# Patient Record
Sex: FEMALE | Race: WHITE | Employment: FULL TIME | URBAN - METROPOLITAN AREA
[De-identification: names, ages, dates, MRNs, and addresses within clinical notes are randomized per-mention and may not be internally consistent; named-entity substitution may affect disease eponyms.]

---

## 2017-04-25 ENCOUNTER — TELEPHONE (OUTPATIENT)
Dept: FAMILY MEDICINE | Facility: CLINIC | Age: 42
End: 2017-04-25

## 2017-04-25 DIAGNOSIS — B00.1 RECURRENT COLD SORES: ICD-10-CM

## 2017-04-25 RX ORDER — VALACYCLOVIR HYDROCHLORIDE 1 G/1
2000 TABLET, FILM COATED ORAL 2 TIMES DAILY
Qty: 30 TABLET | Refills: 0 | Status: SHIPPED | OUTPATIENT
Start: 2017-04-25 | End: 2017-07-19

## 2017-04-25 NOTE — TELEPHONE ENCOUNTER
Patient calling requesting refill of Valtrex.   Last Written Prescription Date: 12/2/15  Last Fill Quantity: 30, # refills: 0  Last Office Visit with [facility], [facility] or [facility] prescribing provider: 10/27/16        Creatinine   Date Value Ref Range Status   10/27/2016 0.79 0.52 - 1.04 mg/dL Final     Refilled per RN protocol.    Erna Cruz RN  [facility]

## 2017-07-19 ENCOUNTER — OFFICE VISIT (OUTPATIENT)
Dept: FAMILY MEDICINE | Facility: CLINIC | Age: 42
End: 2017-07-19
Payer: COMMERCIAL

## 2017-07-19 VITALS
HEIGHT: 70 IN | WEIGHT: 203.7 LBS | SYSTOLIC BLOOD PRESSURE: 124 MMHG | BODY MASS INDEX: 29.16 KG/M2 | OXYGEN SATURATION: 100 % | DIASTOLIC BLOOD PRESSURE: 70 MMHG | HEART RATE: 78 BPM | RESPIRATION RATE: 16 BRPM | TEMPERATURE: 98.1 F

## 2017-07-19 DIAGNOSIS — Z30.09 FAMILY PLANNING: Primary | ICD-10-CM

## 2017-07-19 DIAGNOSIS — Z86.39 HISTORY OF DIABETES MELLITUS, TYPE II: ICD-10-CM

## 2017-07-19 DIAGNOSIS — F31.9 BIPOLAR DEPRESSION (H): ICD-10-CM

## 2017-07-19 DIAGNOSIS — Z86.39 HISTORY OF DIABETES MELLITUS: ICD-10-CM

## 2017-07-19 DIAGNOSIS — Z87.440 HISTORY OF UTI: ICD-10-CM

## 2017-07-19 DIAGNOSIS — B00.1 RECURRENT COLD SORES: ICD-10-CM

## 2017-07-19 LAB
ANION GAP SERPL CALCULATED.3IONS-SCNC: 9 MMOL/L (ref 3–14)
BUN SERPL-MCNC: 13 MG/DL (ref 7–30)
CALCIUM SERPL-MCNC: 9 MG/DL (ref 8.5–10.1)
CHLORIDE SERPL-SCNC: 106 MMOL/L (ref 94–109)
CO2 SERPL-SCNC: 25 MMOL/L (ref 20–32)
CREAT SERPL-MCNC: 0.79 MG/DL (ref 0.52–1.04)
GFR SERPL CREATININE-BSD FRML MDRD: 80 ML/MIN/1.7M2
GLUCOSE SERPL-MCNC: 84 MG/DL (ref 70–99)
HBA1C MFR BLD: 4.9 % (ref 4.3–6)
POTASSIUM SERPL-SCNC: 3.7 MMOL/L (ref 3.4–5.3)
SODIUM SERPL-SCNC: 140 MMOL/L (ref 133–144)

## 2017-07-19 PROCEDURE — 83036 HEMOGLOBIN GLYCOSYLATED A1C: CPT | Performed by: FAMILY MEDICINE

## 2017-07-19 PROCEDURE — 36415 COLL VENOUS BLD VENIPUNCTURE: CPT | Performed by: FAMILY MEDICINE

## 2017-07-19 PROCEDURE — 80048 BASIC METABOLIC PNL TOTAL CA: CPT | Performed by: FAMILY MEDICINE

## 2017-07-19 PROCEDURE — 99214 OFFICE O/P EST MOD 30 MIN: CPT | Performed by: FAMILY MEDICINE

## 2017-07-19 RX ORDER — NORETHINDRONE ACETATE AND ETHINYL ESTRADIOL .02; 1 MG/1; MG/1
1 TABLET ORAL DAILY
Qty: 30 TABLET | Refills: 11 | Status: SHIPPED | OUTPATIENT
Start: 2017-07-19 | End: 2018-07-23

## 2017-07-19 RX ORDER — CIPROFLOXACIN 500 MG/1
500 TABLET, FILM COATED ORAL 2 TIMES DAILY
Qty: 6 TABLET | Refills: 0 | Status: SHIPPED | OUTPATIENT
Start: 2017-07-19 | End: 2017-11-27

## 2017-07-19 RX ORDER — VALACYCLOVIR HYDROCHLORIDE 1 G/1
2000 TABLET, FILM COATED ORAL 2 TIMES DAILY
Qty: 30 TABLET | Refills: 0 | Status: SHIPPED | OUTPATIENT
Start: 2017-07-19 | End: 2018-07-23

## 2017-07-19 NOTE — MR AVS SNAPSHOT
"              After Visit Summary   7/19/2017    Amelia Canavan    MRN: [id number]           Patient Information     Date Of Birth          1975        Visit Information        Provider Department      7/19/2017 9:15 AM Connie Lopez MD [facility]        Today's Diagnoses     Family planning    -  1    Bipolar depression (H)        Recurrent cold sores        History of UTI        History of diabetes mellitus, type II        History of diabetes mellitus           Follow-ups after your visit        Who to contact     If you have questions or need follow up information about today's clinic visit or your schedule please contact [facility] directly at [phone].  Normal or non-critical lab and imaging results will be communicated to you by MyChart, letter or phone within 4 business days after the clinic has received the results. If you do not hear from us within 7 days, please contact the clinic through [facility] or phone. If you have a critical or abnormal lab result, we will notify you by phone as soon as possible.  Submit refill requests through [employer] or call your pharmacy and they will forward the refill request to us. Please allow 3 business days for your refill to be completed.          Additional Information About Your Visit        MyChart Information     [employer] gives you secure access to your electronic health record. If you see a primary care provider, you can also send messages to your care team and make appointments. If you have questions, please call your primary care clinic.  If you do not have a primary care provider, please call [phone] and they will assist you.        Care EveryWhere ID     This is your Care EveryWhere ID. This could be used by other organizations to access your [facility] medical records  [id number]        Your Vitals Were     Pulse Temperature Respirations Height Last Period Pulse Oximetry    78 98.1  F (36.7  C) (Oral) 16 5' 10\" (1.778 m) " 07/05/2017 (Approximate) 100%    Breastfeeding? BMI (Body Mass Index)                No 29.23 kg/m2           Blood Pressure from Last 3 Encounters:   07/19/17 124/70   10/27/16 124/74   03/25/16 124/76    Weight from Last 3 Encounters:   07/19/17 203 lb 11.2 oz (92.4 kg)   10/27/16 230 lb 9.6 oz (104.6 kg)   03/25/16 260 lb (117.9 kg)              We Performed the Following     Basic metabolic panel     Hemoglobin A1c          Today's Medication Changes          These changes are accurate as of: 7/19/17 11:29 AM.  If you have any questions, ask your nurse or doctor.               Start taking these medicines.        Dose/Directions    ciprofloxacin 500 MG tablet   Commonly known as:  CIPRO   Used for:  History of UTI   Started by:  Connie Loepz MD        Dose:  500 mg   Take 1 tablet (500 mg) by mouth 2 times daily   Quantity:  6 tablet   Refills:  0       norethindrone-ethinyl estradiol 1-20 MG-MCG per tablet   Commonly known as:  MICROGESTIN 1/20   Used for:  Family planning   Started by:  Connie Lopez MD        Dose:  1 tablet   Take 1 tablet by mouth daily   Quantity:  30 tablet   Refills:  11         Stop taking these medicines if you haven't already. Please contact your care team if you have questions.     metFORMIN 500 MG 24 hr tablet   Commonly known as:  GLUCOPHAGE-XR   Stopped by:  Connie Lopez MD                Where to get your medicines      These medications were sent to [facility] [id number] [place] [facility] [address]  [address] [address]     Phone:  [phone]     ciprofloxacin 500 MG tablet    norethindrone-ethinyl estradiol 1-20 MG-MCG per tablet    valACYclovir 1000 mg tablet                Primary Care Provider Office Phone # Fax #    Connie Lopez [phone]       [address] [address]        Equal Access to Services     JAMES WEAVER AH: rivka Walters  vandaalyson tk ramey. So [facility] [phone].    ATENCIÓN: Si tyshawn castillo, tiene a oates disposición servicios gratuitos de asistencia lingüística. Rafaela al [phone].    We comply with applicable federal civil rights laws and [place] laws. We do not discriminate on the basis of race, color, national origin, age, disability sex, sexual orientation or gender identity.            Thank you!     Thank you for choosing [facility] [place] [facility]  for your care. Our goal is always to provide you with excellent care. Hearing back from our patients is one way we can continue to improve our services. Please take a few minutes to complete the written survey that you may receive in the mail after your visit with us. Thank you!             Your Updated Medication List - Protect others around you: Learn how to safely use, store and throw away your medicines at www.disposemymeds.org.          This list is accurate as of: 7/19/17 11:29 AM.  Always use your most recent med list.                   Brand Name Dispense Instructions for use Diagnosis    blood glucose monitoring lancets     3 Box    1 each 2 times daily    Diabetes mellitus type II, controlled, with no complications (H)       blood glucose monitoring test strip    no brand specified    3 Box    1 strip by In Vitro route every other day    Diabetes mellitus type II, controlled, with no complications (H)       buPROPion 150 MG 24 hr tablet    WELLBUTRIN XL    90 tablet    Take 1 tablet (150 mg) by mouth daily    Major depressive disorder, recurrent episode, moderate (H)       ciprofloxacin 500 MG tablet    CIPRO    6 tablet    Take 1 tablet (500 mg) by mouth 2 times daily    History of UTI       flaxseed oil 1000 MG Caps      Take 1 capsule by mouth daily        ibuprofen 600 MG tablet    ADVIL/MOTRIN    30 tablet    Take 1 tablet (600 mg) by mouth every 6 hours as needed for pain    Pelvic pain in female        lamoTRIgine 150 MG tablet    LaMICtal     Take 150 mg by mouth daily        norethindrone-ethinyl estradiol 1-20 MG-MCG per tablet    MICROGESTIN 1/20    30 tablet    Take 1 tablet by mouth daily    Family planning       triamcinolone 0.1 % cream    KENALOG    80 g    Apply sparingly to affected area three times daily as needed    Eczema       valACYclovir 1000 mg tablet    VALTREX    30 tablet    Take 2 tablets (2,000 mg) by mouth 2 times daily    Recurrent cold sores

## 2017-07-19 NOTE — PROGRESS NOTES
Hi    -A1C (diabetic test) is normal and indicates that your blood sugar has been in a normal range the last 3 months.    As discussed in appointment today- intentional wt loss and eating healthy has been instrumental , and commendable, so please keep up the good work and I hope your mental health improves as well    Please keep us posted with questions or concerns .      Best Regards,    Connie Lopez MD  [facility]  [phone]

## 2017-07-19 NOTE — PROGRESS NOTES
"  SUBJECTIVE:                                                    Amelia Canavan is a 42 year old female who presents to clinic today for the following health issues:      Chief Complaint   Patient presents with     RECHECK     Birth Control consult   she reports - current oral contraceptive pills, apri, is making her very bad emotionally and would  Like to be back on different oral contraceptive pills   She reports she used some oral contraceptive pills 10 yrs ago- it was ok    She reports her bipolar depression is again bad as well- and working with [facility] to change medications but may have to return to [facility] because its convenient at [facility] location. She denies sucidal plans and reports has good social support  PHQ-9 SCORE 12/2/2015 10/27/2016 7/19/2017   Total Score - - -   Total Score 9 4 11       We discussed history of Diabetes - diagnosed 2013, fasting glucose was 137  She states she is a \"bad patient\"  \"i stopped everything\"  She states she stopped metformin- as it did make her nausted & caused  diarrhea and A1C was great without it  She reports regarding eye exam for Diabetes - she does have an eye specialist and will make annual appointment soon   She reports she has been watching her diet easily and intentional wt loss is coming easily  She reports she has been on the same wt for 3 months which is annoying as she has been careful with diet and excercise        She states she wants a prescription of cipro that she would like to travel with- due to history of urinary tract infection. She reports she is traveling to [place]- ends up having urinary tract infection- with use of water there  PROBLEMS TO ADD ON...    Problem list and histories reviewed & adjusted, as indicated.  Additional history: as documented    Labs reviewed in EPIC    Reviewed and updated as needed this visit by clinical staff  Tobacco  Meds  Soc Hx      Reviewed and updated as needed this visit by Provider     " "    ROS:  Constitutional, HEENT, cardiovascular, pulmonary, gi and gu systems are negative, except as otherwise noted.      OBJECTIVE:   /70  Pulse 78  Temp 98.1  F (36.7  C) (Oral)  Resp 16  Ht 5' 10\" (1.778 m)  Wt 203 lb 11.2 oz (92.4 kg)  LMP 07/05/2017 (Approximate)  SpO2 100%  Breastfeeding? No  BMI 29.23 kg/m2  Body mass index is 29.23 kg/(m^2).  GENERAL: healthy, alert and no distress  NECK: no adenopathy, no asymmetry, masses, or scars and thyroid normal to palpation  RESP: lungs clear to auscultation - no rales, rhonchi or wheezes  CV: regular rate and rhythm, normal S1 S2, no S3 or S4, no murmur, click or rub, no peripheral edema and peripheral pulses strong  ABDOMEN: soft, nontender, no hepatosplenomegaly, no masses and bowel sounds normal  PSYCH: mentation appears normal, affect normal/bright    Diagnostic Test Results:  No results found for this or any previous visit (from the past 24 hour(s)).    ASSESSMENT/PLAN:     (Z30.09) Family planning  (primary encounter diagnosis)  Plan: detail discussion about interaction of hormonal oral contraceptive pills or family planning with medications for bipolar depression- lamictal. We discussed its best to avoid hormonal-, she wants to  try the new oral contraceptive pills for now. Potential medication side effects were discussed with the patient; let me know if any occur.   norethindrone-ethinyl estradiol (MICROGESTIN       1/20) 1-20 MG-MCG per tablet     We also discussed non hormonal long term family planning- IUD- paragrad- in detail, procedure, risks, pros and cons. She will call insurance to find if its covered.she report she is overwhelmed by insurance cost for now    History of  Diabetes mellitus type II, controlled, with no complications (H)  Comment: Diet & exercise controlled. She did not tolearte metformin more than few weeks and have been off of it since 9-10 months  Plan: we checked A1C and its normal- its highly likely that blood " glucose normal now with life style changes and I do not consider that Diabetes  Should still be in the diagnoses list. Her metabolic syndrome has responded to life style changes with intentional wt loss of 60 lbs in past 4 yrs  Lab Results   Component Value Date    A1C 4.9 07/19/2017    A1C 5.0 10/27/2016    A1C 6.0 12/02/2015    A1C 6.0 12/23/2014    A1C 5.3 10/21/2013         (F31.30) Bipolar depression (H)  Plan: Under care of Dr psychiatrist- she reports she will have on more follow up but once medications stable, she would like to get refills from  [facility]  PHQ-9 SCORE 12/2/2015 10/27/2016 7/19/2017   Total Score - - -   Total Score 9 4 11   we discussed counseling and she reports that too is very costly  Safety plan was reviewed; to the ER as needed or call after hours crisis line; [phone]  Education and counseling was done regarding use of medications, psychotherapy options      (B00.1) Recurrent cold sores  Comment: valtrex- Single day course of 2 tabs twice daily, dispense 30 tabs for recurrence  Plan: valACYclovir (VALTREX) 1000 mg tablet  She reports she uses them for flare ups only 2-3/yr    (Z87.440) History of UTI  Comment: Plan: ciprofloxacin (CIPRO) 500 MG tablet        Advised preventative measures to avoid urinary tract infection- by staying hydrated, urinate before and after intercourse, a course is provided to be used only as needed  If symptoms still recur. Follow up is advised for worsening or any unresolved symptoms of concerns    Potential medication side effects were discussed with the patient; let me know if any occur.  The patient indicates understanding of these issues and agrees with the plan.                      Connie Lopez MD  [facility]

## 2017-07-20 ASSESSMENT — PATIENT HEALTH QUESTIONNAIRE - PHQ9: SUM OF ALL RESPONSES TO PHQ QUESTIONS 1-9: 11

## 2017-08-02 DIAGNOSIS — E11.9 CONTROLLED TYPE 2 DIABETES MELLITUS WITHOUT COMPLICATION, WITHOUT LONG-TERM CURRENT USE OF INSULIN (H): ICD-10-CM

## 2017-08-02 DIAGNOSIS — E28.2 PCOS (POLYCYSTIC OVARIAN SYNDROME): ICD-10-CM

## 2017-08-02 RX ORDER — METFORMIN HCL 500 MG
TABLET, EXTENDED RELEASE 24 HR ORAL
Start: 2017-08-02

## 2017-08-02 NOTE — TELEPHONE ENCOUNTER
Denied  Patient no longer on this medication; D/C'd 7/19/2017 (d/t side effects, see OV note from AS)  Sugey FISH RN

## 2017-08-02 NOTE — TELEPHONE ENCOUNTER
Pending Prescriptions:                       Disp   Refills    metFORMIN (GLUCOPHAGE-XR) 500 MG 24 hr ta*180 ta*1            Sig: TAKE 2 TABLETS (1,000 MG) BY MOUTH DAILY WITH           FOOD    Not on current med list.

## 2017-11-27 ENCOUNTER — OFFICE VISIT (OUTPATIENT)
Dept: FAMILY MEDICINE | Facility: CLINIC | Age: 42
End: 2017-11-27
Payer: COMMERCIAL

## 2017-11-27 VITALS
DIASTOLIC BLOOD PRESSURE: 72 MMHG | HEIGHT: 70 IN | WEIGHT: 207.9 LBS | OXYGEN SATURATION: 97 % | HEART RATE: 67 BPM | BODY MASS INDEX: 29.76 KG/M2 | TEMPERATURE: 98.8 F | SYSTOLIC BLOOD PRESSURE: 117 MMHG

## 2017-11-27 DIAGNOSIS — Z12.4 SCREENING FOR MALIGNANT NEOPLASM OF CERVIX: Primary | ICD-10-CM

## 2017-11-27 DIAGNOSIS — Z20.2 EXPOSURE TO STD: ICD-10-CM

## 2017-11-27 DIAGNOSIS — Z86.39 HISTORY OF DIABETES MELLITUS: ICD-10-CM

## 2017-11-27 DIAGNOSIS — L70.8 OTHER ACNE: ICD-10-CM

## 2017-11-27 PROCEDURE — 36415 COLL VENOUS BLD VENIPUNCTURE: CPT | Performed by: FAMILY MEDICINE

## 2017-11-27 PROCEDURE — 87491 CHLMYD TRACH DNA AMP PROBE: CPT | Performed by: FAMILY MEDICINE

## 2017-11-27 PROCEDURE — 87624 HPV HI-RISK TYP POOLED RSLT: CPT | Performed by: FAMILY MEDICINE

## 2017-11-27 PROCEDURE — 86780 TREPONEMA PALLIDUM: CPT | Performed by: FAMILY MEDICINE

## 2017-11-27 PROCEDURE — 87389 HIV-1 AG W/HIV-1&-2 AB AG IA: CPT | Performed by: FAMILY MEDICINE

## 2017-11-27 PROCEDURE — 99214 OFFICE O/P EST MOD 30 MIN: CPT | Performed by: FAMILY MEDICINE

## 2017-11-27 PROCEDURE — 82043 UR ALBUMIN QUANTITATIVE: CPT | Performed by: FAMILY MEDICINE

## 2017-11-27 PROCEDURE — 87591 N.GONORRHOEAE DNA AMP PROB: CPT | Performed by: FAMILY MEDICINE

## 2017-11-27 PROCEDURE — 86803 HEPATITIS C AB TEST: CPT | Performed by: FAMILY MEDICINE

## 2017-11-27 PROCEDURE — G0145 SCR C/V CYTO,THINLAYER,RESCR: HCPCS | Performed by: FAMILY MEDICINE

## 2017-11-27 RX ORDER — SPIRONOLACTONE 50 MG/1
TABLET, FILM COATED ORAL
Refills: 4 | COMMUNITY
Start: 2017-05-17 | End: 2017-11-27

## 2017-11-27 RX ORDER — SPIRONOLACTONE 50 MG/1
100 TABLET, FILM COATED ORAL DAILY
Qty: 90 TABLET | Refills: 1 | Status: SHIPPED | OUTPATIENT
Start: 2017-11-27 | End: 2018-07-23

## 2017-11-27 RX ORDER — SPIRONOLACTONE 50 MG/1
TABLET, FILM COATED ORAL
Qty: 30 TABLET | Refills: 4 | Status: CANCELLED | OUTPATIENT
Start: 2017-11-27

## 2017-11-27 RX ORDER — DOXYCYCLINE 100 MG/1
100 CAPSULE ORAL 2 TIMES DAILY
Qty: 14 CAPSULE | Refills: 0 | Status: SHIPPED | OUTPATIENT
Start: 2017-11-27 | End: 2018-07-23

## 2017-11-27 NOTE — MR AVS SNAPSHOT
After Visit Summary   11/27/2017    Amelia Canavan    MRN: [id number]           Patient Information     Date Of Birth          1975        Visit Information        Provider Department      11/27/2017 12:30 PM Daniela Florence MD [facility]        Today's Diagnoses     Screening for malignant neoplasm of cervix    -  1    Exposure to STD          Care Instructions      When You Have an Abnormal Pap Test    The Pap test is a screening test that checks for cell changes in the cervix, the opening of the uterus. In some cases, it checks for a virus that can cause cervical cancer. If your Pap results were abnormal, you may be worried. But there is no reason to panic. An abnormal Pap test result can mean many things. It may be due to changes (inflammation) caused by normal cell repair or infection. Or you may have a problem called dysplasia that could become cervical cancer. If so, know that dysplasia tends to progress very slowly before becoming cervical cancer. That s why it s so important to have Pap tests as often as directed. Pap tests can show cell changes in the cervix early, when treatment is most effective.  Talk to your health care provider  Be sure to discuss your results with your health care provider. Find out about any follow-up tests you ll need. You may be asked to come back for a second Pap test in a few months. Or, you may be scheduled for an exam so your health care provider can get a closer look at your cervix. In either case, be sure to keep your follow-up visits. They are one of your best safeguards against future problems.  Understanding your risk  Some lifestyle choices can increase your risk of abnormal cell changes. Did you start having sex at a young age? Have you had many sexual partners? Have you had sex without using a latex condom? Do you smoke? If you answered yes to any of these questions, you are more at risk. One of the most common reasons for an  abnormal Pap result is infection with the human papillomavirus (HPV). If your Pap results suggest HPV, further testing may be needed.     The Pap test  During the test:    An instrument called a speculum is inserted into the vagina to hold it open. This lets your health care provider see the cervix.    A small brush or swab is used to take cells from several areas of the cervix. The cells are put into a liquid or on a slide. They are then sent to a lab where they are studied for changes. Your health care provider will contact you with the results.   Date Last Reviewed: 4/26/2015 2000-2017 [employer]. [address] [place] [address]. All rights reserved. This information is not intended as a substitute for professional medical care. Always follow your healthcare professional's instructions.      Sexual Health and HIV  Talk to your partner(s) about safer sex  It is important for you and your intimate partner(s) to have open communication. This will help keep you both healthy and free from new infections. Whether you are casually dating or in a long-term committed relationship, you should:    Tell each other if you have HIV or other STDs.    Agree on safer sex practices to follow to lower your risk of getting or transmitting HIV and STDs.  One positive, one negative  Having HIV doesn't mean you will automatically infect your partner(s)--or that you can't have a healthy, normal and satisfying sex life. It just means you need to reduce the risk of giving the virus to your partner. It is also important to lower your risk of getting a sexually transmitted disease (STD).  Both HIV positive  Some people living with HIV believe that, since they've already been infected, there's no need to engage in safer sex with other HIV-positive partners. But this puts you at risk of getting an STD. It is also possible to get a different strain of the HIV virus in addition to your own. These strains can be  "resistant to HIV medicine, making your HIV harder to treat.   Why is it so important to prevent STDs?  When you have HIV, an STD can take a toll on your immune system. STDs can:    Lower the number of CD4 cells you have in your body. These cells protect your body from infection.    Be more extensive, harder to treat and more likely to come back.    Make it easier to give HIV to your partner.  What can I do to prevent STDs and HIV?  The only way to avoid STDs completely is to not have vaginal, anal or oral sex. If you are sexually active, you can do the following things to lower your chances of getting STDs and HIV:    Choose \"safer sex\" behaviors, such as massage, kissing and mutual masturbation.    Use condoms consistently and correctly for vaginal, anal and oral sex.    Choose mutual monogamy (where neither you or your partner have sex with anyone else). Or, reduce the number of people with whom you have sex.    Know your sex partners (avoid anonymous sex).    Limit or avoid drug and alcohol use before and during sex. These can result in risk-taking.    Stick to your HIV medication regimen. Your partner can also talk to their doctor about taking medicines to lower their chances of getting infected (called pre-exposure prophylaxis, or PrEP).    If you or your partner is having sex with someone else: You should both be tested for STDs every 3 to 6 months, even if you are using condoms. Some STDs (like herpes or HPV) can be transmitted even if you are using a condom.    Have good communication with your partner.  Please talk to your healthcare team if you have any questions.  For more information, visit:  www.cdc.gov/std/hiv/atiosgc-brt-nep.htm  www.cdc.gov/sexualhealth/  www.aids.gov/hiv-aids-basics/  For informational purposes only. Not to replace the advice of your health care provider. From \"STDs and HIV - CDC Fact Sheet,\" courtesy CDC.gov, last revised 10/14/15 and \"Sexual Health,\" courtesy AIDS.gov, last " revised 11/02/10. Published by [facility]. All rights reserved. [employer] 765600 - 06/16.     *CHLAMYDIA [Female, Treated]    You have an infection called Chlamydia. This is a sexually transmitted disease (STD). It is highly contagious and passed by sexual contact with an infected partner. Chlamydia can infect the internal sex organs (cervix, uterus or Fallopian tubes). Less often, it can infect the mouth, throat and anus.  Women with an infection in the cervix may have no symptoms or only mild symptoms early in the illness. Therefore, it is possible to pass this infection without knowing you have it.  When symptoms do occur in a woman, they usually start 2-10 days after exposure. There may be a thick vaginal discharge and pain or burning when passing urine. If the infection spreads to the Fallopian tubes it is called pelvic inflammatory disease ( PID ). This causes lower abdominal pain and fever. If not treated, Chlamydia can cause infertility (unable to have children) by scarring the Fallopian tubes. PID also increases the risk of ectopic (tubal) pregnancy in the future.  Chlamydia can be treated and cured. Treatment is with antibiotic medicine.   HOME CARE:   1. Your sexual partner must be treated at the same time, even if there are no symptoms. Your partner should contact their own doctor or go to an urgent care clinic or the Public Health Department to be examined and treated.  2. Avoid sexual activity until both you and your partner have finished taking all of the antibiotic medicine, and your doctor has told you that you are cured.  3. Take all medicines until they are finished. Otherwise, symptoms may recur.  4. Learn about  safe sex  practices and use these in the future. The safest sex is with a partner who has tested negative and only has sex with you. Condoms offer protection from spreading some sexually transmitted diseases including Gonorrhea, Chlamydia and HIV, but are not a  guarantee.  FOLLOW UP with your doctor or as advised by our staff. If a culture test was taken, you may call us in three days for the results, or as directed. Another culture test should be taken 4-6 weeks after treatment to be sure the infection has cleared. Follow up with your doctor or the Public Health Department for complete STD screening, including HIV testing. For more information about STD's, contact the National STD Hotline: 1-790.921.6471.  GET PROMPT MEDICAL ATTENTION if any of the following occur:    No improvement after three days of treatment    New or increasing lower abdominal pain or back pain    Unexpected vaginal bleeding    Weakness, dizziness or fainting    Repeated vomiting    Inability to urinate due to pain    Rash or joint pain    Painful open sores around the outer vagina    Enlarged painful lymph nodes (lumps) in the groin    8363-1489 [employer]. [address] [place] [address]. All rights reserved. This information is not intended as a substitute for professional medical care. Always follow your healthcare professional's instructions.  This information has been modified by your health care provider with permission from the publisher.            Follow-ups after your visit        Follow-up notes from your care team     Return if symptoms worsen or fail to improve.      Who to contact     If you have questions or need follow up information about today's clinic visit or your schedule please contact [facility] directly at [phone].  Normal or non-critical lab and imaging results will be communicated to you by MyChart, letter or phone within 4 business days after the clinic has received the results. If you do not hear from us within 7 days, please contact the clinic through MyChart or phone. If you have a critical or abnormal lab result, we will notify you by phone as soon as possible.  Submit refill requests through [email or web address] or call your pharmacy and  "they will forward the refill request to us. Please allow 3 business days for your refill to be completed.          Additional Information About Your Visit        [employer] Information     [employer] gives you secure access to your electronic health record. If you see a primary care provider, you can also send messages to your care team and make appointments. If you have questions, please call your primary care clinic.  If you do not have a primary care provider, please call [phone] and they will assist you.        Care EveryWhere ID     This is your Care EveryWhere ID. This could be used by other organizations to access your [facility] medical records  [id number]        Your Vitals Were     Pulse Temperature Height Last Period Pulse Oximetry Breastfeeding?    67 98.8  F (37.1  C) (Oral) 5' 10\" (1.778 m) 11/23/2017 (Approximate) 97% No    BMI (Body Mass Index)                   29.83 kg/m2            Blood Pressure from Last 3 Encounters:   11/27/17 117/72   07/19/17 124/70   10/27/16 124/74    Weight from Last 3 Encounters:   11/27/17 207 lb 14.4 oz (94.3 kg)   07/19/17 203 lb 11.2 oz (92.4 kg)   10/27/16 230 lb 9.6 oz (104.6 kg)              We Performed the Following     Anti Treponema     CHLAMYDIA TRACHOMATIS PCR     Hepatitis C antibody     HIV Antigen Antibody Combo     NEISSERIA GONORRHOEA PCR     Pap imaged thin layer screen with HPV - recommended age 30 - 65 years (select HPV order below)        Primary Care Provider Office Phone # Fax #    Connie Lopez [phone] [address] [facility] [address]        Equal Access to Services     [facility]: Hadii elvin mendenhall hadashsebastien Solaura, waaxda luqadaha, qaybta kaalmatk lopez. So [facility] [phone].    ATENCIÓN: Si habla español, tiene a oates disposición servicios gratuitos de asistencia lingüística. Llame al [phone].    We comply with applicable federal civil rights laws and " [place] laws. We do not discriminate on the basis of race, color, national origin, age, disability, sex, sexual orientation, or gender identity.            Thank you!     Thank you for choosing [facility]  for your care. Our goal is always to provide you with excellent care. Hearing back from our patients is one way we can continue to improve our services. Please take a few minutes to complete the written survey that you may receive in the mail after your visit with us. Thank you!             Your Updated Medication List - Protect others around you: Learn how to safely use, store and throw away your medicines at www.disposemymeds.org.          This list is accurate as of: 11/27/17  1:06 PM.  Always use your most recent med list.                   Brand Name Dispense Instructions for use Diagnosis    blood glucose monitoring lancets     3 Box    1 each 2 times daily    Diabetes mellitus type II, controlled, with no complications (H)       blood glucose monitoring test strip    no brand specified    3 Box    1 strip by In Vitro route every other day    Diabetes mellitus type II, controlled, with no complications (H)       buPROPion 150 MG 24 hr tablet    WELLBUTRIN XL    90 tablet    Take 1 tablet (150 mg) by mouth daily    Major depressive disorder, recurrent episode, moderate (H)       flaxseed oil 1000 MG Caps      Take 1 capsule by mouth daily        ibuprofen 600 MG tablet    ADVIL/MOTRIN    30 tablet    Take 1 tablet (600 mg) by mouth every 6 hours as needed for pain    Pelvic pain in female       lamoTRIgine 150 MG tablet    LaMICtal     Take 150 mg by mouth daily        norethindrone-ethinyl estradiol 1-20 MG-MCG per tablet    MICROGESTIN 1/20    30 tablet    Take 1 tablet by mouth daily    Family planning       spironolactone 50 MG tablet    ALDACTONE     TAKE 1 TABLET EVERY MORNING FOR 2 WEEKS,THEN INCREASE TO 2 TABLETS EVERY MORNING THERAFTER        triamcinolone 0.1 % cream    KENALOG    80 g     Apply sparingly to affected area three times daily as needed    Eczema       valACYclovir 1000 mg tablet    VALTREX    30 tablet    Take 2 tablets (2,000 mg) by mouth 2 times daily    Recurrent cold sores

## 2017-11-27 NOTE — PROGRESS NOTES
"  SUBJECTIVE:   Amelia Canavan is a 42 year old female who presents to clinic today for the following health issues:    STD testing       Duration: recently female partner tested positive for chlamydia pt would like to be tested for everything.     Also due for PAP Smear   The patient was informed by a previous partner that she was exposed to chlamydia. The patient reports that on 11/28/17 she had an unprotected sexual intercourse with a female partner. She was informed today that she was exposed. Currently, she denies any abnormal vaginal discharge. She had UTI and yeast infection during the first week of november both were treated with medications. She denies any back pain or pelvic pain. Denies noticing any rashes or lesions in the groin area.   Acne: hx of PCOS and hormonal acne. Currently well controlled with Aldactone  Hx of abnormal pap-smear: The patient reports brownish discharge prior to her menstrual cycles but denies abnormal spotting between cycles. Hx of abnormal pap.     Problem list and histories reviewed & adjusted, as indicated.      Allergies   Allergen Reactions     Sulfa Drugs Anaphylaxis       Reviewed and updated as needed this visit by clinical staffTobacco  Allergies       Reviewed and updated as needed this visit by Provider         ROS:  Constitutional, HEENT, cardiovascular, pulmonary, gi and gu systems are negative, except as otherwise noted.      OBJECTIVE:   /72  Pulse 67  Temp 98.8  F (37.1  C) (Oral)  Ht 5' 10\" (1.778 m)  Wt 207 lb 14.4 oz (94.3 kg)  LMP 11/23/2017 (Approximate)  SpO2 97%  Breastfeeding? No  BMI 29.83 kg/m2  Body mass index is 29.83 kg/(m^2).  GENERAL: healthy, alert and no distress  RESP: lungs clear to auscultation - no rales, rhonchi or wheezes  CV: regular rate and rhythm, normal S1 S2, no S3 or S4, no murmur, click or rub, no peripheral edema and peripheral pulses strong  ABDOMEN: soft, nontender, no hepatosplenomegaly, no masses and bowel sounds " normal   (female): normal female external genitalia, normal urethral meatus, vaginal mucosa, erythematous cervical os (hx of leep)    Diagnostic Test Results:  none     ASSESSMENT/PLAN:   1. Screening for malignant neoplasm of cervix  Assessment: Leep - 2003 8/2013: NIL pap, neg HR HPV. Plan cotest pap & HPV in 3 years.  10/27/16: NIL pap, + HR HPV (not 16 or 18). Plan cotest in 1 year per provider.  10/14/17 Cotest reminder letter sent ([facility])  - Pap imaged thin layer screen with HPV - recommended age 30 - 65 years (select HPV order below)    2. Exposure to STD  Assessment. Patient was exposed to chlamydia. Desires STD panel. She was informed to let her partner know.   Plan:  - NEISSERIA GONORRHOEA PCR  - CHLAMYDIA TRACHOMATIS PCR  - HIV Antigen Antibody Combo  - Anti Treponema  - Hepatitis C antibody  - doxycycline (VIBRAMYCIN) 100 MG capsule; Take 1 capsule (100 mg) by mouth 2 times daily  Dispense: 14 capsule; Refill: 0    3. History of diabetes mellitus  Plan:  - Albumin Random Urine Quantitative with Creat Ratio    4. Hormonal acne  Plan:  - spironolactone (ALDACTONE) 50 MG tablet; Take 2 tablets (100 mg) by mouth daily  Dispense: 90 tablet; Refill: 1    Daniela Florence MD  [facility]  PAGER: [phone]

## 2017-11-27 NOTE — PATIENT INSTRUCTIONS
When You Have an Abnormal Pap Test    The Pap test is a screening test that checks for cell changes in the cervix, the opening of the uterus. In some cases, it checks for a virus that can cause cervical cancer. If your Pap results were abnormal, you may be worried. But there is no reason to panic. An abnormal Pap test result can mean many things. It may be due to changes (inflammation) caused by normal cell repair or infection. Or you may have a problem called dysplasia that could become cervical cancer. If so, know that dysplasia tends to progress very slowly before becoming cervical cancer. That s why it s so important to have Pap tests as often as directed. Pap tests can show cell changes in the cervix early, when treatment is most effective.  Talk to your health care provider  Be sure to discuss your results with your health care provider. Find out about any follow-up tests you ll need. You may be asked to come back for a second Pap test in a few months. Or, you may be scheduled for an exam so your health care provider can get a closer look at your cervix. In either case, be sure to keep your follow-up visits. They are one of your best safeguards against future problems.  Understanding your risk  Some lifestyle choices can increase your risk of abnormal cell changes. Did you start having sex at a young age? Have you had many sexual partners? Have you had sex without using a latex condom? Do you smoke? If you answered yes to any of these questions, you are more at risk. One of the most common reasons for an abnormal Pap result is infection with the human papillomavirus (HPV). If your Pap results suggest HPV, further testing may be needed.     The Pap test  During the test:    An instrument called a speculum is inserted into the vagina to hold it open. This lets your health care provider see the cervix.    A small brush or swab is used to take cells from several areas of the cervix. The cells are put into a  liquid or on a slide. They are then sent to a lab where they are studied for changes. Your health care provider will contact you with the results.   Date Last Reviewed: 4/26/2015 2000-2017 [employer]. [address] [place] [address], [place] [address]. All rights reserved. This information is not intended as a substitute for professional medical care. Always follow your healthcare professional's instructions.      Sexual Health and HIV  Talk to your partner(s) about safer sex  It is important for you and your intimate partner(s) to have open communication. This will help keep you both healthy and free from new infections. Whether you are casually dating or in a long-term committed relationship, you should:    Tell each other if you have HIV or other STDs.    Agree on safer sex practices to follow to lower your risk of getting or transmitting HIV and STDs.  One positive, one negative  Having HIV doesn't mean you will automatically infect your partner(s)--or that you can't have a healthy, normal and satisfying sex life. It just means you need to reduce the risk of giving the virus to your partner. It is also important to lower your risk of getting a sexually transmitted disease (STD).  Both HIV positive  Some people living with HIV believe that, since they've already been infected, there's no need to engage in safer sex with other HIV-positive partners. But this puts you at risk of getting an STD. It is also possible to get a different strain of the HIV virus in addition to your own. These strains can be resistant to HIV medicine, making your HIV harder to treat.   Why is it so important to prevent STDs?  When you have HIV, an STD can take a toll on your immune system. STDs can:    Lower the number of CD4 cells you have in your body. These cells protect your body from infection.    Be more extensive, harder to treat and more likely to come back.    Make it easier to give HIV to your partner.  What can I do to  "prevent STDs and HIV?  The only way to avoid STDs completely is to not have vaginal, anal or oral sex. If you are sexually active, you can do the following things to lower your chances of getting STDs and HIV:    Choose \"safer sex\" behaviors, such as massage, kissing and mutual masturbation.    Use condoms consistently and correctly for vaginal, anal and oral sex.    Choose mutual monogamy (where neither you or your partner have sex with anyone else). Or, reduce the number of people with whom you have sex.    Know your sex partners (avoid anonymous sex).    Limit or avoid drug and alcohol use before and during sex. These can result in risk-taking.    Stick to your HIV medication regimen. Your partner can also talk to their doctor about taking medicines to lower their chances of getting infected (called pre-exposure prophylaxis, or PrEP).    If you or your partner is having sex with someone else: You should both be tested for STDs every 3 to 6 months, even if you are using condoms. Some STDs (like herpes or HPV) can be transmitted even if you are using a condom.    Have good communication with your partner.  Please talk to your healthcare team if you have any questions.  For more information, visit:  www.cdc.gov/std/hiv/yomrwce-wet-xbt.htm  www.cdc.gov/sexualhealth/  www.aids.gov/hiv-aids-basics/  For informational purposes only. Not to replace the advice of your health care provider. From \"STDs and HIV - CDC Fact Sheet,\" courtesy CDC.gov, last revised 10/14/15 and \"Sexual Health,\" courtesy AIDS.gov, last revised 11/02/10. Published by [facility]. All rights reserved. Service Seeking 179312 - 06/16.     *CHLAMYDIA [Female, Treated]    You have an infection called Chlamydia. This is a sexually transmitted disease (STD). It is highly contagious and passed by sexual contact with an infected partner. Chlamydia can infect the internal sex organs (cervix, uterus or Fallopian tubes). Less often, it can infect the " mouth, throat and anus.  Women with an infection in the cervix may have no symptoms or only mild symptoms early in the illness. Therefore, it is possible to pass this infection without knowing you have it.  When symptoms do occur in a woman, they usually start 2-10 days after exposure. There may be a thick vaginal discharge and pain or burning when passing urine. If the infection spreads to the Fallopian tubes it is called pelvic inflammatory disease ( PID ). This causes lower abdominal pain and fever. If not treated, Chlamydia can cause infertility (unable to have children) by scarring the Fallopian tubes. PID also increases the risk of ectopic (tubal) pregnancy in the future.  Chlamydia can be treated and cured. Treatment is with antibiotic medicine.   HOME CARE:   1. Your sexual partner must be treated at the same time, even if there are no symptoms. Your partner should contact their own doctor or go to an urgent care clinic or the Public Health Department to be examined and treated.  2. Avoid sexual activity until both you and your partner have finished taking all of the antibiotic medicine, and your doctor has told you that you are cured.  3. Take all medicines until they are finished. Otherwise, symptoms may recur.  4. Learn about  safe sex  practices and use these in the future. The safest sex is with a partner who has tested negative and only has sex with you. Condoms offer protection from spreading some sexually transmitted diseases including Gonorrhea, Chlamydia and HIV, but are not a guarantee.  FOLLOW UP with your doctor or as advised by our staff. If a culture test was taken, you may call us in three days for the results, or as directed. Another culture test should be taken 4-6 weeks after treatment to be sure the infection has cleared. Follow up with your doctor or the Public Health Department for complete STD screening, including HIV testing. For more information about STD's, contact the National STD  Hotline: 1-189.993.2564.  GET PROMPT MEDICAL ATTENTION if any of the following occur:    No improvement after three days of treatment    New or increasing lower abdominal pain or back pain    Unexpected vaginal bleeding    Weakness, dizziness or fainting    Repeated vomiting    Inability to urinate due to pain    Rash or joint pain    Painful open sores around the outer vagina    Enlarged painful lymph nodes (lumps) in the groin    0838-8619 [employer]. [address] [place] [address], [place] [address]. All rights reserved. This information is not intended as a substitute for professional medical care. Always follow your healthcare professional's instructions.  This information has been modified by your health care provider with permission from the publisher.

## 2017-11-28 LAB
C TRACH DNA SPEC QL NAA+PROBE: NEGATIVE
CREAT UR-MCNC: 116 MG/DL
HCV AB SERPL QL IA: NONREACTIVE
HIV 1+2 AB+HIV1 P24 AG SERPL QL IA: NONREACTIVE
MICROALBUMIN UR-MCNC: 9 MG/L
MICROALBUMIN/CREAT UR: 8.03 MG/G CR (ref 0–25)
N GONORRHOEA DNA SPEC QL NAA+PROBE: NEGATIVE
SPECIMEN SOURCE: NORMAL
SPECIMEN SOURCE: NORMAL
T PALLIDUM IGG+IGM SER QL: NEGATIVE

## 2017-11-30 LAB
COPATH REPORT: NORMAL
PAP: NORMAL

## 2017-12-04 LAB
FINAL DIAGNOSIS: ABNORMAL
HPV HR 12 DNA CVX QL NAA+PROBE: POSITIVE
HPV16 DNA SPEC QL NAA+PROBE: NEGATIVE
HPV18 DNA SPEC QL NAA+PROBE: NEGATIVE
SPECIMEN DESCRIPTION: ABNORMAL

## 2017-12-18 ENCOUNTER — OFFICE VISIT (OUTPATIENT)
Dept: OBGYN | Facility: CLINIC | Age: 42
End: 2017-12-18
Payer: COMMERCIAL

## 2017-12-18 VITALS
HEART RATE: 63 BPM | TEMPERATURE: 99 F | BODY MASS INDEX: 29.99 KG/M2 | DIASTOLIC BLOOD PRESSURE: 65 MMHG | WEIGHT: 209 LBS | SYSTOLIC BLOOD PRESSURE: 111 MMHG

## 2017-12-18 DIAGNOSIS — Z13.9 SCREENING PROCEDURE: Primary | ICD-10-CM

## 2017-12-18 DIAGNOSIS — B97.7 HPV IN FEMALE: ICD-10-CM

## 2017-12-18 LAB — BETA HCG QUAL IFA URINE: NEGATIVE

## 2017-12-18 PROCEDURE — 84703 CHORIONIC GONADOTROPIN ASSAY: CPT | Performed by: OBSTETRICS & GYNECOLOGY

## 2017-12-18 PROCEDURE — 57452 EXAM OF CERVIX W/SCOPE: CPT | Performed by: OBSTETRICS & GYNECOLOGY

## 2017-12-18 NOTE — PROGRESS NOTES
Amelia Canavan is a 42 year old female  who presents for repeat colposcopy, referred by Daniela Florence. Pap smear on 17 showed: normal pap with other high risk HPV present (2 years in a row): . The prior pap showed normal and with high risk HPV present.       Patient's last menstrual period was 2017 (approximate).  UPT today is negative  Patient does not smoke  Type of contraception: condoms  Age at first sexual intercourse: 15  Number of sexual partners (lifetime): more than 6   Past GYN history: No STD history  Prior cervical/vaginal disease: none.  Prior cervical treatment: LEEP.      PROCEDURE:      Before the procedure, it was ensured that the patient was educated regarding the nature of her findings to date, the implications, and what was to be done. She has been made aware of the role of HPV, the natural history of infection, ways to minimize her future risk, the effect of HPV on the cervix, and treatment options available should they be indicated. The details of the colposcopic procedure were reviewed. All questions were answered before proceeding, and informed consent was therefore obtained.      Speculum placed in vagina and excellent visualization of cervix acheived, cervix swabbed x 3 with acetic acid solution.      FINDINGS:  Cervix: no visible lesions.  No biopsy indicated  Please refer to images section for details.  SCJ seen?: yes   ECC done?: No  Satisfactory examination?: yes      ASSESSMENT: Normal exam without visible pathology.  PLAN: Repeat co-testing in 1 year.      Erna Nur MD

## 2017-12-18 NOTE — MR AVS SNAPSHOT
After Visit Summary   12/18/2017    Amelia Canavan    MRN: [id number]           Patient Information     Date Of Birth          1975        Visit Information        Provider Department      12/18/2017 9:30 AM Erna Nur MD; RD PROC [facility]        Today's Diagnoses     Screening procedure    -  1    HPV in female           Follow-ups after your visit        Who to contact     If you have questions or need follow up information about today's clinic visit or your schedule please contact [facility] directly at [phone].  Normal or non-critical lab and imaging results will be communicated to you by Touchbasehart, letter or phone within 4 business days after the clinic has received the results. If you do not hear from us within 7 days, please contact the clinic through Zopat or phone. If you have a critical or abnormal lab result, we will notify you by phone as soon as possible.  Submit refill requests through Limundo or call your pharmacy and they will forward the refill request to us. Please allow 3 business days for your refill to be completed.          Additional Information About Your Visit        MyChart Information     [employer]undo gives you secure access to your electronic health record. If you see a primary care provider, you can also send messages to your care team and make appointments. If you have questions, please call your primary care clinic.  If you do not have a primary care provider, please call [phone] and they will assist you.        Care EveryWhere ID     This is your Care EveryWhere ID. This could be used by other organizations to access your [facility] medical records  [id number]        Your Vitals Were     Pulse Temperature Last Period BMI (Body Mass Index)          63 99  F (37.2  C) (Oral) 11/23/2017 (Approximate) 29.99 kg/m2         Blood Pressure from Last 3 Encounters:   12/18/17 111/65   11/27/17 117/72   07/19/17 124/70     Weight from Last 3 Encounters:   12/18/17 209 lb (94.8 kg)   11/27/17 207 lb 14.4 oz (94.3 kg)   07/19/17 203 lb 11.2 oz (92.4 kg)              We Performed the Following     Beta HCG qual IFA urine - [facility] and [place]     COLP CERVIX/UPPER VAGINA        Primary Care Provider Office Phone # Fax #    Connie Gildardo Lopez [phone] [address] [facility] [address]        Equal Access to Services     [facility]: Hadii aad ku hadasho Soomaali, waaxda luqadaha, qaybta kaalmada adeegyada, waxnilsa damicoin haykatlynn matty banerjee . So [facility] [phone].    ATENCIÓN: Si habla español, tiene a oates disposición servicios gratuitos de asistencia lingüística. Llame al [phone].    We comply with applicable federal civil rights laws and [place] laws. We do not discriminate on the basis of race, color, national origin, age, disability, sex, sexual orientation, or gender identity.            Thank you!     Thank you for choosing [facility]  for your care. Our goal is always to provide you with excellent care. Hearing back from our patients is one way we can continue to improve our services. Please take a few minutes to complete the written survey that you may receive in the mail after your visit with us. Thank you!             Your Updated Medication List - Protect others around you: Learn how to safely use, store and throw away your medicines at www.disposemymeds.org.          This list is accurate as of: 12/18/17 10:05 AM.  Always use your most recent med list.                   Brand Name Dispense Instructions for use Diagnosis    blood glucose monitoring lancets     3 Box    1 each 2 times daily    Diabetes mellitus type II, controlled, with no complications (H)       blood glucose monitoring test strip    no brand specified    3 Box    1 strip by In Vitro route every other day    Diabetes mellitus type II, controlled, with no complications (H)       buPROPion 150 MG 24  hr tablet    WELLBUTRIN XL    90 tablet    Take 1 tablet (150 mg) by mouth daily    Major depressive disorder, recurrent episode, moderate (H)       doxycycline 100 MG capsule    VIBRAMYCIN    14 capsule    Take 1 capsule (100 mg) by mouth 2 times daily    Exposure to STD       flaxseed oil 1000 MG Caps      Take 1 capsule by mouth daily        ibuprofen 600 MG tablet    ADVIL/MOTRIN    30 tablet    Take 1 tablet (600 mg) by mouth every 6 hours as needed for pain    Pelvic pain in female       lamoTRIgine 150 MG tablet    LaMICtal     Take 150 mg by mouth daily        norethindrone-ethinyl estradiol 1-20 MG-MCG per tablet    MICROGESTIN 1/20    30 tablet    Take 1 tablet by mouth daily    Family planning       spironolactone 50 MG tablet    ALDACTONE    90 tablet    Take 2 tablets (100 mg) by mouth daily    Other acne       triamcinolone 0.1 % cream    KENALOG    80 g    Apply sparingly to affected area three times daily as needed    Eczema       valACYclovir 1000 mg tablet    VALTREX    30 tablet    Take 2 tablets (2,000 mg) by mouth 2 times daily    Recurrent cold sores

## 2017-12-18 NOTE — NURSING NOTE
"Chief Complaint   Patient presents with     Colposcopy       Initial /65  Pulse 63  Temp 99  F (37.2  C) (Oral)  Wt 209 lb (94.8 kg)  LMP 11/23/2017 (Approximate)  BMI 29.99 kg/m2 Estimated body mass index is 29.99 kg/(m^2) as calculated from the following:    Height as of 11/27/17: 5' 10\" (1.778 m).    Weight as of this encounter: 209 lb (94.8 kg).  BP completed using cuff size: regular    No obstetric history on file.    The following HM Due: NONE      The following patient reported/Care Every where data was sent to:  P ABSTRACT QUALITY INITIATIVES [[id number]]  DAVID Rodriguez             "

## 2018-07-20 ENCOUNTER — TELEPHONE (OUTPATIENT)
Dept: FAMILY MEDICINE | Facility: CLINIC | Age: 43
End: 2018-07-20

## 2018-07-20 NOTE — TELEPHONE ENCOUNTER
Reason for Call:  Medication or medication refill:    Do you use a [facility]?  Name of the pharmacy and phone number for the current request:         [id number] [place], [place] - [address] [place] [address]    Name of the medication requested:     1.  ciprofloxacin (CIPRO) 500 MG tablet     2.  valACYclovir (VALTREX) 1000 mg tablet      Other request: patient is going to be traveling [place] and would like to have these medications with her incase she would get a UTI.    Please call with questions/concerns.    Can we leave a detailed message on this number? YES    Phone number patient can be reached at: Home number on file [phone] (home)    Best Time: anytime    Call taken on 7/20/2018 at 1:47 PM by Frances Young  .

## 2018-07-20 NOTE — TELEPHONE ENCOUNTER
Patient last saw Dr. Lopez 7/19/17  Informed patient she will need OV.  Last physical 10/2016.  Patient leaving out of town in about 7 days.  Offered appointment with Dr. Lopez on Monday 7/23.    Patient states she will call back.  [facility].  Last Valtex Rx 7/10/17 for #30 no refills. No hx: UTI's  Will call back to schedule appointment if [facility] can't help her  Rika Bartlett RN

## 2018-07-23 ENCOUNTER — OFFICE VISIT (OUTPATIENT)
Dept: FAMILY MEDICINE | Facility: CLINIC | Age: 43
End: 2018-07-23
Payer: COMMERCIAL

## 2018-07-23 VITALS
HEIGHT: 70 IN | OXYGEN SATURATION: 94 % | HEART RATE: 63 BPM | BODY MASS INDEX: 31.48 KG/M2 | TEMPERATURE: 98.2 F | RESPIRATION RATE: 16 BRPM | SYSTOLIC BLOOD PRESSURE: 113 MMHG | WEIGHT: 219.9 LBS | DIASTOLIC BLOOD PRESSURE: 74 MMHG

## 2018-07-23 DIAGNOSIS — B00.1 RECURRENT COLD SORES: Primary | ICD-10-CM

## 2018-07-23 DIAGNOSIS — E88.810 METABOLIC SYNDROME: ICD-10-CM

## 2018-07-23 DIAGNOSIS — E28.2 PCOS (POLYCYSTIC OVARIAN SYNDROME): ICD-10-CM

## 2018-07-23 DIAGNOSIS — Z87.440 HISTORY OF UTI: ICD-10-CM

## 2018-07-23 DIAGNOSIS — F31.9 BIPOLAR DEPRESSION (H): ICD-10-CM

## 2018-07-23 DIAGNOSIS — Z87.42 HISTORY OF VAGINAL INFECTION: ICD-10-CM

## 2018-07-23 LAB — HBA1C MFR BLD: 5.1 % (ref 0–5.6)

## 2018-07-23 PROCEDURE — 36415 COLL VENOUS BLD VENIPUNCTURE: CPT | Performed by: FAMILY MEDICINE

## 2018-07-23 PROCEDURE — 99214 OFFICE O/P EST MOD 30 MIN: CPT | Performed by: FAMILY MEDICINE

## 2018-07-23 PROCEDURE — 80048 BASIC METABOLIC PNL TOTAL CA: CPT | Performed by: FAMILY MEDICINE

## 2018-07-23 PROCEDURE — 83036 HEMOGLOBIN GLYCOSYLATED A1C: CPT | Performed by: FAMILY MEDICINE

## 2018-07-23 RX ORDER — CIPROFLOXACIN 250 MG/1
250 TABLET, FILM COATED ORAL 2 TIMES DAILY
Qty: 6 TABLET | Refills: 0 | Status: CANCELLED | OUTPATIENT
Start: 2018-07-23

## 2018-07-23 RX ORDER — FLUCONAZOLE 150 MG/1
150 TABLET ORAL ONCE
Qty: 1 TABLET | Refills: 1 | Status: SHIPPED | OUTPATIENT
Start: 2018-07-23 | End: 2018-07-23

## 2018-07-23 RX ORDER — CEPHALEXIN 500 MG/1
500 CAPSULE ORAL 2 TIMES DAILY
Qty: 6 CAPSULE | Refills: 0 | Status: SHIPPED | OUTPATIENT
Start: 2018-07-23 | End: 2018-07-26

## 2018-07-23 RX ORDER — VALACYCLOVIR HYDROCHLORIDE 1 G/1
2000 TABLET, FILM COATED ORAL 2 TIMES DAILY
Qty: 30 TABLET | Refills: 0 | Status: SHIPPED | OUTPATIENT
Start: 2018-07-23 | End: 2018-12-13

## 2018-07-23 NOTE — PROGRESS NOTES
SUBJECTIVE:   Amelia Canavan is a 43 year old female who presents to clinic today for the following health issues:    PATIENT DOES NOT WANT TO HAVE HER PHYSICAL EXAM TODAY    Chief Complaint   Patient presents with     Consult     Would like to get antibiotics before traveling out of the country (Diflucan, Antibiotics and Valtrex)     [place]  Reports history of urinary tract infection- would like to keep antibiotic handy & with antibiotic , also gets vaginal yeast infection    History of recurrent cold sore- antiviral help- gets them at least once a month  Stress, sun, dehydration- all contribute to the cold sores      PROBLEMS TO ADD ON...hx of bipolar  Under care of NP- @ [facility]- currently on Lamictal 150 mg once daily & Wellbutrin  1 tabs of 150 mg once daily   Lamictal started - 7/2016 and huge improvement  Problem list and histories reviewed & adjusted, as indicated.  Additional history: as documented    Patient Active Problem List   Diagnosis     CARDIOVASCULAR SCREENING; LDL GOAL LESS THAN 160     Bipolar depression (H)     PCOS (polycystic ovarian syndrome)     BMI 35.0-35.9,adult     Metabolic syndrome     S/P LEEP of cervix     Mixed hyperlipidemia     History of diabetes mellitus     HPV in female     Past Surgical History:   Procedure Laterality Date     COLPOSCOPY, BIOPSY, COMBINED  2003     LEEP TX, CERVICAL  2003       Social History   Substance Use Topics     Smoking status: Never Smoker     Smokeless tobacco: Never Used      Comment: Maybe once every 2 months     Alcohol use 2.4 oz/week     4 Standard drinks or equivalent per week      Comment: 5 drinks per week     Family History   Problem Relation Age of Onset     Hypertension Mother      HEART DISEASE Maternal Grandfather      Family History Negative Father            Reviewed and updated as needed this visit by clinical staff  Tobacco  Allergies  Meds  Problems  Med Hx  Surg Hx  Fam  "Hx  Soc Hx        Reviewed and updated as needed this visit by Provider  Problems         ROS:  Constitutional, HEENT, cardiovascular, pulmonary, gi and gu systems are negative, except as otherwise noted.    OBJECTIVE:     /74  Pulse 63  Temp 98.2  F (36.8  C) (Oral)  Resp 16  Ht 5' 10\" (1.778 m)  Wt 219 lb 14.4 oz (99.7 kg)  SpO2 94%  Breastfeeding? No  BMI 31.55 kg/m2  Body mass index is 31.55 kg/(m^2).  GENERAL: healthy, alert and no distress  NECK: no adenopathy, no asymmetry, masses, or scars and thyroid normal to palpation  RESP: lungs clear to auscultation - no rales, rhonchi or wheezes  CV: regular rate and rhythm, normal S1 S2, no S3 or S4, no murmur, click or rub, no peripheral edema and peripheral pulses strong  ABDOMEN: soft, nontender, no hepatosplenomegaly, no masses and bowel sounds normal  MS: no gross musculoskeletal defects noted, no edema    Diagnostic Test Results:  No results found for this or any previous visit (from the past 24 hour(s)).    ASSESSMENT/PLAN:   1. Recurrent cold sores   respond to antiviral- as needed  / single day course    - valACYclovir (VALTREX) 1000 mg tablet; Take 2 tablets (2,000 mg) by mouth 2 times daily  Dispense: 30 tablet; Refill: 0  - Basic metabolic panel  (Ca, Cl, CO2, Creat, Gluc, K, Na, BUN)    2. History of UTI  encouraged to stay hydrated  Seek provider care locally first  OK to travel with a course but to take only if indicated   - cephALEXin (KEFLEX) 500 MG capsule; Take 1 capsule (500 mg) by mouth 2 times daily for 3 days  Dispense: 6 capsule; Refill: 0    3. History of vaginal infection  Antibiotic induced yeast infection  OK to carry it with her   - fluconazole (DIFLUCAN) 150 MG tablet; Take 1 tablet (150 mg) by mouth once for 1 dose  Dispense: 1 tablet; Refill: 1    4. PCOS (polycystic ovarian syndrome)  Stable- no concerns     5. Metabolic syndrome  - Hemoglobin A1c  encouraged to stay active, intentional weight loss    6. Bipolar " depression (H)  Under care of NP- @ [facility]- currently on Lamictal 150 mg once daily & Wellbutrin  1 tabs of 150 mg once daily   Lamictal started - 7/2016 and huge improvement  Connie Lopez MD  [facility]

## 2018-07-23 NOTE — MR AVS SNAPSHOT
"              After Visit Summary   7/23/2018    Amelia Canavan    MRN: [id number]           Patient Information     Date Of Birth          1975        Visit Information        Provider Department      7/23/2018 10:40 AM Connie Lopez MD [facility]        Today's Diagnoses     Recurrent cold sores    -  1    History of UTI        History of vaginal infection        PCOS (polycystic ovarian syndrome)        Metabolic syndrome        Bipolar depression (H)           Follow-ups after your visit        Who to contact     If you have questions or need follow up information about today's clinic visit or your schedule please contact [facility] directly at [phone].  Normal or non-critical lab and imaging results will be communicated to you by MyChart, letter or phone within 4 business days after the clinic has received the results. If you do not hear from us within 7 days, please contact the clinic through [employer] or phone. If you have a critical or abnormal lab result, we will notify you by phone as soon as possible.  Submit refill requests through [employer] or call your pharmacy and they will forward the refill request to us. Please allow 3 business days for your refill to be completed.          Additional Information About Your Visit        MyChart Information     [employer] gives you secure access to your electronic health record. If you see a primary care provider, you can also send messages to your care team and make appointments. If you have questions, please call your primary care clinic.  If you do not have a primary care provider, please call [phone] and they will assist you.        Care EveryWhere ID     This is your Care EveryWhere ID. This could be used by other organizations to access your [place] medical records  [id number]        Your Vitals Were     Pulse Temperature Respirations Height Pulse Oximetry Breastfeeding?    63 98.2  F (36.8  C) (Oral) 16 5' 10\" (1.778 " m) 94% No    BMI (Body Mass Index)                   31.55 kg/m2            Blood Pressure from Last 3 Encounters:   07/23/18 113/74   12/18/17 111/65   11/27/17 117/72    Weight from Last 3 Encounters:   07/23/18 219 lb 14.4 oz (99.7 kg)   12/18/17 209 lb (94.8 kg)   11/27/17 207 lb 14.4 oz (94.3 kg)              We Performed the Following     Basic metabolic panel  (Ca, Cl, CO2, Creat, Gluc, K, Na, BUN)     Hemoglobin A1c          Today's Medication Changes          These changes are accurate as of 7/23/18 12:27 PM.  If you have any questions, ask your nurse or doctor.               Start taking these medicines.        Dose/Directions    cephALEXin 500 MG capsule   Commonly known as:  KEFLEX   Used for:  History of UTI   Started by:  Connie Lopez MD        Dose:  500 mg   Take 1 capsule (500 mg) by mouth 2 times daily for 3 days   Quantity:  6 capsule   Refills:  0       fluconazole 150 MG tablet   Commonly known as:  DIFLUCAN   Used for:  History of vaginal infection   Started by:  Connie Lopez MD        Dose:  150 mg   Take 1 tablet (150 mg) by mouth once for 1 dose   Quantity:  1 tablet   Refills:  1            Where to get your medicines      These medications were sent to [place] [address] [place] [facility] [address] [facility]  [address] [place] [id number]     Phone:  [phone]     cephALEXin 500 MG capsule    fluconazole 150 MG tablet    valACYclovir 1000 mg tablet                Primary Care Provider Office Phone # Fax #    Connie Lopez [phone]       [facility] [address]        Equal Access to Services     [facility] AH: Jignesh Jones, rivka cope, qajordi coronaaltk knapp. So [facility] [phone].    ATENCIÓN: Si habla español, tiene a oates disposición servicios gratuitos de asistencia lingüística. Llame al [phone].    We comply with applicable [facility] civil  rights laws and [place] laws. We do not discriminate on the basis of race, color, national origin, age, disability, sex, sexual orientation, or gender identity.            Thank you!     Thank you for choosing [facility]  for your care. Our goal is always to provide you with excellent care. Hearing back from our patients is one way we can continue to improve our services. Please take a few minutes to complete the written survey that you may receive in the mail after your visit with us. Thank you!             Your Updated Medication List - Protect others around you: Learn how to safely use, store and throw away your medicines at www.disposemymeds.org.          This list is accurate as of 7/23/18 12:27 PM.  Always use your most recent med list.                   Brand Name Dispense Instructions for use Diagnosis    blood glucose monitoring lancets     3 Box    1 each 2 times daily    Diabetes mellitus type II, controlled, with no complications (H)       blood glucose monitoring test strip    no brand specified    3 Box    1 strip by In Vitro route every other day    Diabetes mellitus type II, controlled, with no complications (H)       buPROPion 150 MG 24 hr tablet    WELLBUTRIN XL    90 tablet    Take 1 tablet (150 mg) by mouth daily    Major depressive disorder, recurrent episode, moderate (H)       cephALEXin 500 MG capsule    KEFLEX    6 capsule    Take 1 capsule (500 mg) by mouth 2 times daily for 3 days    History of UTI       flaxseed oil 1000 MG Caps      Take 1 capsule by mouth daily        fluconazole 150 MG tablet    DIFLUCAN    1 tablet    Take 1 tablet (150 mg) by mouth once for 1 dose    History of vaginal infection       ibuprofen 600 MG tablet    ADVIL/MOTRIN    30 tablet    Take 1 tablet (600 mg) by mouth every 6 hours as needed for pain    Pelvic pain in female       lamoTRIgine 150 MG tablet    LaMICtal     Take 150 mg by mouth daily        triamcinolone 0.1 % cream    KENALOG    80 g     Apply sparingly to affected area three times daily as needed    Eczema       valACYclovir 1000 mg tablet    VALTREX    30 tablet    Take 2 tablets (2,000 mg) by mouth 2 times daily    Recurrent cold sores

## 2018-07-24 LAB
ANION GAP SERPL CALCULATED.3IONS-SCNC: 3 MMOL/L (ref 3–14)
BUN SERPL-MCNC: 12 MG/DL (ref 7–30)
CALCIUM SERPL-MCNC: 8.6 MG/DL (ref 8.5–10.1)
CHLORIDE SERPL-SCNC: 106 MMOL/L (ref 94–109)
CO2 SERPL-SCNC: 29 MMOL/L (ref 20–32)
CREAT SERPL-MCNC: 0.84 MG/DL (ref 0.52–1.04)
GFR SERPL CREATININE-BSD FRML MDRD: 74 ML/MIN/1.7M2
GLUCOSE SERPL-MCNC: 84 MG/DL (ref 70–99)
POTASSIUM SERPL-SCNC: 4.1 MMOL/L (ref 3.4–5.3)
SODIUM SERPL-SCNC: 138 MMOL/L (ref 133–144)

## 2018-07-24 NOTE — PROGRESS NOTES
-Kidney function is normal (Cr, GFR), Sodium is normal, Potassium is normal, Calcium is normal, Glucose is normal (diabetes screening test).

## 2018-07-24 NOTE — PROGRESS NOTES
-A1C (test of diabetes control the last 2-3 months) is at your goal. Please continue with current plan. Also, see me and recheck your A1C test in 6 months.

## 2018-10-04 ENCOUNTER — OFFICE VISIT (OUTPATIENT)
Dept: FAMILY MEDICINE | Facility: CLINIC | Age: 43
End: 2018-10-04
Payer: COMMERCIAL

## 2018-10-04 VITALS
HEIGHT: 70 IN | HEART RATE: 82 BPM | BODY MASS INDEX: 31.25 KG/M2 | TEMPERATURE: 99.1 F | WEIGHT: 218.3 LBS | OXYGEN SATURATION: 99 % | SYSTOLIC BLOOD PRESSURE: 133 MMHG | DIASTOLIC BLOOD PRESSURE: 85 MMHG

## 2018-10-04 DIAGNOSIS — B96.89 BACTERIAL VAGINOSIS: ICD-10-CM

## 2018-10-04 DIAGNOSIS — B00.1 RECURRENT COLD SORES: ICD-10-CM

## 2018-10-04 DIAGNOSIS — Z11.3 SCREEN FOR STD (SEXUALLY TRANSMITTED DISEASE): ICD-10-CM

## 2018-10-04 DIAGNOSIS — M79.672 HEEL PAIN, BILATERAL: ICD-10-CM

## 2018-10-04 DIAGNOSIS — N97.9 INABILITY TO CONCEIVE, FEMALE: Primary | ICD-10-CM

## 2018-10-04 DIAGNOSIS — M70.72 BURSITIS OF OTHER BURSA OF BOTH HIPS: ICD-10-CM

## 2018-10-04 DIAGNOSIS — F31.9 BIPOLAR DEPRESSION (H): ICD-10-CM

## 2018-10-04 DIAGNOSIS — Z86.19 HISTORY OF HPV INFECTION: ICD-10-CM

## 2018-10-04 DIAGNOSIS — M70.71 BURSITIS OF OTHER BURSA OF BOTH HIPS: ICD-10-CM

## 2018-10-04 DIAGNOSIS — Z98.890 S/P LEEP OF CERVIX: ICD-10-CM

## 2018-10-04 DIAGNOSIS — M79.671 HEEL PAIN, BILATERAL: ICD-10-CM

## 2018-10-04 DIAGNOSIS — N76.0 BACTERIAL VAGINOSIS: ICD-10-CM

## 2018-10-04 LAB
HBV SURFACE AG SERPL QL IA: NONREACTIVE
HCV AB SERPL QL IA: NONREACTIVE
HIV 1+2 AB+HIV1 P24 AG SERPL QL IA: NONREACTIVE
SPECIMEN SOURCE: ABNORMAL
WET PREP SPEC: ABNORMAL

## 2018-10-04 PROCEDURE — 86803 HEPATITIS C AB TEST: CPT | Performed by: FAMILY MEDICINE

## 2018-10-04 PROCEDURE — 87591 N.GONORRHOEAE DNA AMP PROB: CPT | Performed by: FAMILY MEDICINE

## 2018-10-04 PROCEDURE — 88175 CYTOPATH C/V AUTO FLUID REDO: CPT | Performed by: FAMILY MEDICINE

## 2018-10-04 PROCEDURE — 86780 TREPONEMA PALLIDUM: CPT | Performed by: FAMILY MEDICINE

## 2018-10-04 PROCEDURE — 36415 COLL VENOUS BLD VENIPUNCTURE: CPT | Performed by: FAMILY MEDICINE

## 2018-10-04 PROCEDURE — 87491 CHLMYD TRACH DNA AMP PROBE: CPT | Performed by: FAMILY MEDICINE

## 2018-10-04 PROCEDURE — 99214 OFFICE O/P EST MOD 30 MIN: CPT | Performed by: FAMILY MEDICINE

## 2018-10-04 PROCEDURE — 87340 HEPATITIS B SURFACE AG IA: CPT | Performed by: FAMILY MEDICINE

## 2018-10-04 PROCEDURE — 87389 HIV-1 AG W/HIV-1&-2 AB AG IA: CPT | Performed by: FAMILY MEDICINE

## 2018-10-04 PROCEDURE — 87624 HPV HI-RISK TYP POOLED RSLT: CPT | Performed by: FAMILY MEDICINE

## 2018-10-04 PROCEDURE — 87210 SMEAR WET MOUNT SALINE/INK: CPT | Performed by: FAMILY MEDICINE

## 2018-10-04 RX ORDER — METRONIDAZOLE 500 MG/1
500 TABLET ORAL 2 TIMES DAILY
Qty: 14 TABLET | Refills: 0 | Status: SHIPPED | OUTPATIENT
Start: 2018-10-04 | End: 2018-12-13

## 2018-10-04 NOTE — MR AVS SNAPSHOT
After Visit Summary   10/4/2018    Amelia Canavan    MRN: [id number]           Patient Information     Date Of Birth          1975        Visit Information        Provider Department      10/4/2018 10:00 AM Connie Lopez MD [facility]        Today's Diagnoses     Inability to conceive, female    -  1    Bipolar depression (H)        Bursitis of other bursa of both hips        Heel pain, bilateral        Screen for STD (sexually transmitted disease)        Recurrent cold sores        History of HPV infection        S/P LEEP of cervix           Follow-ups after your visit        Additional Services     OB/GYN REFERRAL       Your provider has referred you to:  FMG: [facility] [phone]  [email or web address]  FM[facility]: [facility] [place] [phone]   [email or web address]    Please be aware that coverage of these services is subject to the terms and limitations of your health insurance plan.  Call member services at your health plan with any benefit or coverage questions.      Please bring the following with you to your appointment:    (1) Any X-Rays, CTs or MRIs which have been performed.  Contact the facility where they were done to arrange for  prior to your scheduled appointment.   (2) List of current medications   (3) This referral request   (4) Any documents/labs given to you for this referral            ORTHO  REFERRAL       [facility] is referring you to the Orthopedic  Services at [facility].       The  Representative will assist you in the coordination of your Orthopedic and Musculoskeletal Care as prescribed by your physician.    The  Representative will call you within 1 business day to help schedule your appointment, or you may contact the  Representative at:    All areas ~ [phone]     Type of Referral : Surgical / Specialist - orthopedic       Timeframe requested: 3 - 5 days    Coverage of these services is subject to the terms and limitations of your health insurance plan.  Please call member services at your health plan with any benefit or coverage questions.      If X-rays, CT or MRI's have been performed, please contact the facility where they were done to arrange for , prior to your scheduled appointment.  Please bring this referral request to your appointment and present it to your specialist.                  Who to contact     If you have questions or need follow up information about today's clinic visit or your schedule please contact [facility] directly at [phone].  Normal or non-critical lab and imaging results will be communicated to you by MyChart, letter or phone within 4 business days after the clinic has received the results. If you do not hear from us within 7 days, please contact the clinic through [employer] or phone. If you have a critical or abnormal lab result, we will notify you by phone as soon as possible.  Submit refill requests through "[employer]" or call your pharmacy and they will forward the refill request to us. Please allow 3 business days for your refill to be completed.          Additional Information About Your Visit        MyChart Information     "[employer]" gives you secure access to your electronic health record. If you see a primary care provider, you can also send messages to your care team and make appointments. If you have questions, please call your primary care clinic.  If you do not have a primary care provider, please call [phone] and they will assist you.        Care EveryWhere ID     This is your Care EveryWhere ID. This could be used by other organizations to access your [place] medical records  [id number]        Your Vitals Were     Pulse Temperature Height Last Period Pulse Oximetry BMI (Body Mass Index)    82  "99.1  F (37.3  C) (Oral) 5' 10\" (1.778 m) 10/03/2018 99% 31.32 kg/m2       Blood Pressure from Last 3 Encounters:   10/04/18 133/85   07/23/18 113/74   12/18/17 111/65    Weight from Last 3 Encounters:   10/04/18 218 lb 4.8 oz (99 kg)   07/23/18 219 lb 14.4 oz (99.7 kg)   12/18/17 209 lb (94.8 kg)              We Performed the Following     Chlamydia trachomatis PCR     Hepatitis B surface antigen     Hepatitis C antibody     HIV Antigen Antibody Combo     Neisseria gonorrhoeae PCR     OB/GYN REFERRAL     ORTHO  REFERRAL     Pap imaged thin layer diagnostic with HPV (select HPV order below)     Treponema Abs w Reflex to RPR and Titer     Wet prep        Primary Care Provider Office Phone # Fax #    Connie Gildardo Lopez [phone] [address] [facility] [address]        Equal Access to Services     [facility]: Hadii aad ku hadasho Soomaali, waaxda luqadaha, qaybta kaalmada adeegyada, waxay idiin hayaan adealexia banerjee . So [facility] [phone].    ATENCIÓN: Si habla español, tiene a oates disposición servicios gratuitos de asistencia lingüística. Llame al [phone].    We comply with applicable federal civil rights laws and [place] laws. We do not discriminate on the basis of race, color, national origin, age, disability, sex, sexual orientation, or gender identity.            Thank you!     Thank you for choosing [facility]  for your care. Our goal is always to provide you with excellent care. Hearing back from our patients is one way we can continue to improve our services. Please take a few minutes to complete the written survey that you may receive in the mail after your visit with us. Thank you!             Your Updated Medication List - Protect others around you: Learn how to safely use, store and throw away your medicines at www.disposemymeds.org.          This list is accurate as of 10/4/18 10:57 AM.  Always use your most recent med list.          "          Brand Name Dispense Instructions for use Diagnosis    blood glucose monitoring lancets     3 Box    1 each 2 times daily    Diabetes mellitus type II, controlled, with no complications (H)       blood glucose monitoring test strip    no brand specified    3 Box    1 strip by In Vitro route every other day    Diabetes mellitus type II, controlled, with no complications (H)       buPROPion 150 MG 24 hr tablet    WELLBUTRIN XL    90 tablet    Take 1 tablet (150 mg) by mouth daily    Major depressive disorder, recurrent episode, moderate (H)       flaxseed oil 1000 MG Caps      Take 1 capsule by mouth daily        ibuprofen 600 MG tablet    ADVIL/MOTRIN    30 tablet    Take 1 tablet (600 mg) by mouth every 6 hours as needed for pain    Pelvic pain in female       lamoTRIgine 150 MG tablet    LaMICtal     Take 150 mg by mouth daily        triamcinolone 0.1 % cream    KENALOG    80 g    Apply sparingly to affected area three times daily as needed    Eczema       valACYclovir 1000 mg tablet    VALTREX    30 tablet    Take 2 tablets (2,000 mg) by mouth 2 times daily    Recurrent cold sores

## 2018-10-04 NOTE — NURSING NOTE
"Chief Complaint   Patient presents with     LAB REQUEST     fertility testing     STD     screening     Musculoskeletal Problem     foot and hip pain-no specific injury, both sdies, R hip is worse, L foot is worse, some swelling on L foot, unknown if any discoloration     /85  Pulse 82  Temp 99.1  F (37.3  C) (Oral)  Ht 5' 10\" (1.778 m)  Wt 218 lb 4.8 oz (99 kg)  LMP 10/03/2018  SpO2 99%  BMI 31.32 kg/m2 Estimated body mass index is 31.32 kg/(m^2) as calculated from the following:    Height as of this encounter: 5' 10\" (1.778 m).    Weight as of this encounter: 218 lb 4.8 oz (99 kg).        Health Maintenance due pending provider review:  PHQ9    given    Kinjal Chen CMA  "

## 2018-10-04 NOTE — LETTER
October 15, 2018    Amelia Canavan  [address] [place] [id number]    Dear Alissa,  We are happy to inform you that your PAP smear result from 10/04/18 is normal.  We are now able to do a follow up test on PAP smears. The DNA test is for HPV (Human Papilloma Virus). Cervical cancer is closely linked with certain types of HPV. Your results showed no evidence of high risk HPV.  Therefore we recommend you return in 1 year for your next pap smear and HPV test.  You will still need to return to the clinic every year for an annual exam and other preventive tests.  If you have additional questions regarding this result, please call our registered nurse, Jeanna at [phone].  Sincerely,    Connie Lopez MD./  Jeanna Stone RN-Pap Tracking

## 2018-10-04 NOTE — PROGRESS NOTES
Wet prep positive for bacterial vaginosis    I do recommend treatment with metronidazole orally twice a day for a week, that's been sent to the pharmacy for you to .    The medication  may give metallic taste in mouth, but over all well tolerated by most. Please call with questions or concern as needed.

## 2018-10-04 NOTE — PROGRESS NOTES
SUBJECTIVE:   Amelia Canavan is a 43 year old female who presents to clinic today for the following health issues:      Chief Complaint   Patient presents with     LAB REQUEST     fertility testing     STD     screening     Musculoskeletal Problem     foot and hip pain-no specific injury, both sdies, R hip is worse, L foot is worse, some swelling on L foot, unknown if any discoloration     Medication Question     would like AS to start prescribing bupropion and lamictal, pt states stable on current dose     1. Wants to conceive with her partner- lora while visiting him in [place] jan - may 2019  He works/live sin [place]   See him every three months    2, would like all sexually transmitted infection checked. Had some vaginal burning but it improved with regular mensuration started about 2 days ago    3. History of bipolar disorder- Under care of NP- @ [facility]-  Lamictal 150 mg once daily & Wellbutrin  300mg (taking 2 tabs of 150 mg ) once daily   Lamictal started 3 months ago- 2016 and made huge improvements in stablizing the mood    4.heel pain for a long time- has a spur. Hip pain, R>L- started from  Right hip I  The back and radiates to both groin  Pain is constant dull ache and worsens with activities  Over the counter medications help as needed    No swelling of joint.  Been going on  for months and now esclated    PROBLEMS TO ADD ON...as above  Problem list and histories reviewed & adjusted, as indicated.  Additional history: as documented    Patient Active Problem List   Diagnosis     CARDIOVASCULAR SCREENING; LDL GOAL LESS THAN 160     Bipolar depression (H)     BMI 35.0-35.9,adult     Metabolic syndrome     S/P LEEP of cervix     Mixed hyperlipidemia     History of diabetes mellitus     HPV in female     Past Surgical History:   Procedure Laterality Date     COLPOSCOPY, BIOPSY, COMBINED  2003     LEEP TX, CERVICAL  2003       Social History   Substance Use Topics     Smoking status: Never Smoker      "Smokeless tobacco: Never Used      Comment: Maybe once every 2 months     Alcohol use 2.4 oz/week     4 Standard drinks or equivalent per week      Comment: 5 drinks per week     Family History   Problem Relation Age of Onset     Hypertension Mother      HEART DISEASE Maternal Grandfather      Family History Negative Father            Reviewed and updated as needed this visit by clinical staff  Tobacco  Allergies  Meds  Med Hx  Surg Hx  Fam Hx  Soc Hx      Reviewed and updated as needed this visit by Provider         ROS:  Constitutional, HEENT, cardiovascular, pulmonary, gi and gu systems are negative, except as otherwise noted.    OBJECTIVE:     /85  Pulse 82  Temp 99.1  F (37.3  C) (Oral)  Ht 5' 10\" (1.778 m)  Wt 218 lb 4.8 oz (99 kg)  LMP 10/03/2018  SpO2 99%  BMI 31.32 kg/m2  Body mass index is 31.32 kg/(m^2).  GENERAL: healthy, alert and no distress  NECK: no adenopathy, no asymmetry, masses, or scars and thyroid normal to palpation  RESP: lungs clear to auscultation - no rales, rhonchi or wheezes  CV: regular rate and rhythm, normal S1 S2, no S3 or S4, no murmur, click or rub, no peripheral edema and peripheral pulses strong  ABDOMEN: soft, nontender, no hepatosplenomegaly, no masses and bowel sounds normal   (female): normal female external genitalia, normal urethral meatus, vaginal mucosa, normal cervix/adnexa/uterus without masses . Normal scant mensuration  Pap and chlamydia & gonorrhea , wet pap obtained   MS: calcaneal prominence no media;l tubercle tenderness on bilateral heel exam  Mild right SI tenderness but otherwise good ROM on both hip  no gross musculoskeletal defects noted, no edema    Diagnostic Test Results:  Results for orders placed or performed in visit on 10/04/18 (from the past 24 hour(s))   Wet prep   Result Value Ref Range    Specimen Description Vagina     Wet Prep No Trichomonas seen     Wet Prep Clue cells seen (A)     Wet Prep No yeast seen  "       ASSESSMENT/PLAN:   1. Inability to conceive, female  Plan: we discussed - since menstrual  periods are normal- most likely ovulating.  Biggest barrier is long distance relationship & infrequent   - OB/GYN REFERRAL  - normal ultrasound     2. Bipolar depression (H)  Plan: no medications changes for now  Under care of NP- @ [place] [facility]-  Lamictal 150 mg once daily & Wellbutrin  300mg (taking 2 tabs of 150 mg ) once daily   Lamictal started 3 months ago-  and made huge improvements in stablizing the mood    we discussed the medications lithium and Wellbutrin & pregnancy implication. Both are class c medications- where caution is advised .  With lithium- some concerns with cleft lip, cleft palate & with Wellbutrin concerns with congenital cardiovascular in  but the human studies are very limited. Eventually the risk of medications with have to be weighed against the risk of untreated mental health on pregnancy.    She will need close monitoring of her mood under acre of her current mental health provider and also OBG/GYN- if she conceives    3. Bursitis of other bursa of both hips  Plan: more likely related to posture.  Encouarged PHYSICAL THERAPY and she is more intersted in seeing ortho  Mild arthritis as well- that will respond to gradual weight loss and she is trying   - ORTHO  REFERRAL    4. Heel pain, bilateral  Plan:- ORTHO  REFERRAL    5. Screen for STD (sexually transmitted disease)  Plan:  - Chlamydia trachomatis PCR  - Neisseria gonorrhoeae PCR  - Wet prep  - Treponema Abs w Reflex to RPR and Titer  - HIV Antigen Antibody Combo  - Hepatitis B surface antigen  - Hepatitis C antibody    6. Recurrent cold sores  Plan: valtrex as needed  helps    7. History of HPV infection  Plan: had colposcopy- negative in dec  Had positive human pappiloma virus in October- pap was NILL  - Pap imaged thin layer diagnostic with HPV (select HPV order below)    8. S/P LEEP of cervix  plan-  Pap imaged thin layer diagnostic with HPV (select HPV order below)    9. Bacterial vaginosis  Plan:   - metroNIDAZOLE (FLAGYL) 500 MG tablet; Take 1 tablet (500 mg) by mouth 2 times daily  Dispense: 14 tablet; Refill: 0    Connie Lopez MD  [facility]

## 2018-10-05 LAB
C TRACH DNA SPEC QL NAA+PROBE: NEGATIVE
N GONORRHOEA DNA SPEC QL NAA+PROBE: NEGATIVE
SPECIMEN SOURCE: NORMAL
SPECIMEN SOURCE: NORMAL
T PALLIDUM AB SER QL: NONREACTIVE

## 2018-10-08 LAB
COPATH REPORT: NORMAL
PAP: NORMAL

## 2018-10-12 LAB
FINAL DIAGNOSIS: NORMAL
HPV HR 12 DNA CVX QL NAA+PROBE: NEGATIVE
HPV16 DNA SPEC QL NAA+PROBE: NEGATIVE
HPV18 DNA SPEC QL NAA+PROBE: NEGATIVE
SPECIMEN DESCRIPTION: NORMAL
SPECIMEN SOURCE CVX/VAG CYTO: NORMAL

## 2018-12-03 ENCOUNTER — OFFICE VISIT (OUTPATIENT)
Dept: ORTHOPEDICS | Facility: CLINIC | Age: 43
End: 2018-12-03
Payer: COMMERCIAL

## 2018-12-03 ENCOUNTER — RADIANT APPOINTMENT (OUTPATIENT)
Dept: GENERAL RADIOLOGY | Facility: CLINIC | Age: 43
End: 2018-12-03
Attending: ORTHOPAEDIC SURGERY
Payer: COMMERCIAL

## 2018-12-03 VITALS
DIASTOLIC BLOOD PRESSURE: 80 MMHG | HEIGHT: 70 IN | SYSTOLIC BLOOD PRESSURE: 122 MMHG | WEIGHT: 210 LBS | BODY MASS INDEX: 30.06 KG/M2

## 2018-12-03 DIAGNOSIS — M25.552 HIP PAIN, LEFT: ICD-10-CM

## 2018-12-03 DIAGNOSIS — M17.0 PRIMARY OSTEOARTHRITIS OF BOTH KNEES: ICD-10-CM

## 2018-12-03 DIAGNOSIS — M25.859 FEMORAL ACETABULAR IMPINGEMENT: ICD-10-CM

## 2018-12-03 DIAGNOSIS — M25.552 HIP PAIN, LEFT: Primary | ICD-10-CM

## 2018-12-03 PROCEDURE — 73502 X-RAY EXAM HIP UNI 2-3 VIEWS: CPT | Performed by: ORTHOPAEDIC SURGERY

## 2018-12-03 PROCEDURE — 99204 OFFICE O/P NEW MOD 45 MIN: CPT | Performed by: ORTHOPAEDIC SURGERY

## 2018-12-03 NOTE — MR AVS SNAPSHOT
After Visit Summary   12/3/2018    Amelia Canavan    MRN: [id number]           Patient Information     Date Of Birth          1975        Visit Information        Provider Department      12/3/2018 1:40 PM Francisco Javier Ag MD [facility] ORTHOPEDIC SURGERY        Today's Diagnoses     Hip pain, left    -  1      Care Instructions      Hip Arthroscopy: Repairing Femoroacetabular Impingement           When excess bone has formed on the edge of the  ball  (femoral head) or the  socket  (acetabulum) of the hip, it is called femoroacetabular impingement (IBETH). IBETH can cause pain and limit movement. Arthroscopy can repair IBETH with only small incisions and special instruments.  In the operating room  Just before surgery, you may be asked several times which hip is to be treated. This is a standard safety measure. In the operating room, you will likely receive general anesthesia to make you sleep.  During the procedure  After you receive anesthesia, your leg is gently pulled to distract, or widen, the hip joint. Next, the surgeon makes a few small incisions called portals. Through these portals, he or she inserts surgical tools, including the arthroscope. The arthroscope sends images of the joint to a video screen. These images allow the surgeon to look inside the joint. The joint is filled with sterile fluid to help the surgeon see more clearly.  Repairing femoroacetabular impingement  To treat IBETH, the area is reshaped by removing excess bone. Excess bone can be removed from the socket side or ball side of the hip joint, or both. IBETH can lead to other problems, such as labral tears or chondral damage. If present, these problems are also treated. Once the surgeon finishes the procedure, the portals are closed and bandaged. Then you are taken to the recovery room.  Date Last Reviewed: 7/1/2016 2000-2018 [employer]. [address] [place] [address], [place] [address]. All rights reserved. This  "information is not intended as a substitute for professional medical care. Always follow your healthcare professional's instructions.                Follow-ups after your visit        Your next 10 appointments already scheduled     Dec 04, 2018  1:15 PM CST   New Visit with Juliana Del Rio DPM, Podiatry/Foot and Ankle Surgery   [facility]    [address] [facility] [id number] [phone]              Who to contact     If you have questions or need follow up information about today's clinic visit or your schedule please contact [facility] ORTHOPEDIC SURGERY directly at [phone].  Normal or non-critical lab and imaging results will be communicated to you by "[employer]hart, letter or phone within 4 business days after the clinic has received the results. If you do not hear from us within 7 days, please contact the clinic through "[employer]hart or phone. If you have a critical or abnormal lab result, we will notify you by phone as soon as possible.  Submit refill requests through [employer] or call your pharmacy and they will forward the refill request to us. Please allow 3 business days for your refill to be completed.          Additional Information About Your Visit        MyChart Information     [employer] gives you secure access to your electronic health record. If you see a primary care provider, you can also send messages to your care team and make appointments. If you have questions, please call your primary care clinic.  If you do not have a primary care provider, please call [phone] and they will assist you.        Care EveryWhere ID     This is your Care EveryWhere ID. This could be used by other organizations to access your [place] medical records  [id number]        Your Vitals Were     Height BMI (Body Mass Index)                5' 10\" (1.778 m) 30.13 kg/m2           Blood Pressure from Last 3 Encounters:   12/03/18 122/80   10/04/18 133/85 "   07/23/18 113/74    Weight from Last 3 Encounters:   12/03/18 210 lb (95.3 kg)   10/04/18 218 lb 4.8 oz (99 kg)   07/23/18 219 lb 14.4 oz (99.7 kg)               Primary Care Provider Office Phone # Fax Sen Ewing MD John [phone] [address] [facility] [address]        Equal Access to Services     JAMES WEAVER : Hadii aad ku hadasho Soomaali, waaxda luqadaha, qaybta kaalmada adeegyada, waxay idiin hayaan adeeg kharachitra la'nereyda . [facility] [phone].    ATENCIÓN: Si habla español, tiene a oates disposición servicios gratuitos de asistencia lingüística. [facility] [phone].    We comply with applicable federal civil rights laws and [place] laws. We do not discriminate on the basis of race, color, national origin, age, disability, sex, sexual orientation, or gender identity.            Thank you!     Thank you for choosing [facility] ORTHOPEDIC SURGERY  for your care. Our goal is always to provide you with excellent care. Hearing back from our patients is one way we can continue to improve our services. Please take a few minutes to complete the written survey that you may receive in the mail after your visit with us. Thank you!             Your Updated Medication List - Protect others around you: Learn how to safely use, store and throw away your medicines at www.disposemymeds.org.          This list is accurate as of 12/3/18  2:21 PM.  Always use your most recent med list.                   Brand Name Dispense Instructions for use Diagnosis    blood glucose monitoring lancets     3 Box    1 each 2 times daily    Diabetes mellitus type II, controlled, with no complications (H)       blood glucose monitoring test strip    NO BRAND SPECIFIED    3 Box    1 strip by In Vitro route every other day    Diabetes mellitus type II, controlled, with no complications (H)       buPROPion 150 MG 24 hr tablet    WELLBUTRIN XL    90 tablet    Take 1 tablet (150 mg) by mouth daily     Major depressive disorder, recurrent episode, moderate (H)       flaxseed oil 1000 MG Caps      Take 1 capsule by mouth daily        ibuprofen 600 MG tablet    ADVIL/MOTRIN    30 tablet    Take 1 tablet (600 mg) by mouth every 6 hours as needed for pain    Pelvic pain in female       lamoTRIgine 150 MG tablet    LaMICtal     Take 150 mg by mouth daily        metroNIDAZOLE 500 MG tablet    FLAGYL    14 tablet    Take 1 tablet (500 mg) by mouth 2 times daily    Bacterial vaginosis       triamcinolone 0.1 % external cream    KENALOG    80 g    Apply sparingly to affected area three times daily as needed    Eczema       valACYclovir 1000 mg tablet    VALTREX    30 tablet    Take 2 tablets (2,000 mg) by mouth 2 times daily    Recurrent cold sores

## 2018-12-03 NOTE — PATIENT INSTRUCTIONS
Hip Arthroscopy: Repairing Femoroacetabular Impingement           When excess bone has formed on the edge of the  ball  (femoral head) or the  socket  (acetabulum) of the hip, it is called femoroacetabular impingement (IBETH). IBETH can cause pain and limit movement. Arthroscopy can repair IBETH with only small incisions and special instruments.  In the operating room  Just before surgery, you may be asked several times which hip is to be treated. This is a standard safety measure. In the operating room, you will likely receive general anesthesia to make you sleep.  During the procedure  After you receive anesthesia, your leg is gently pulled to distract, or widen, the hip joint. Next, the surgeon makes a few small incisions called portals. Through these portals, he or she inserts surgical tools, including the arthroscope. The arthroscope sends images of the joint to a video screen. These images allow the surgeon to look inside the joint. The joint is filled with sterile fluid to help the surgeon see more clearly.  Repairing femoroacetabular impingement  To treat IBETH, the area is reshaped by removing excess bone. Excess bone can be removed from the socket side or ball side of the hip joint, or both. IBEHT can lead to other problems, such as labral tears or chondral damage. If present, these problems are also treated. Once the surgeon finishes the procedure, the portals are closed and bandaged. Then you are taken to the recovery room.  Date Last Reviewed: 7/1/2016 2000-2018 [employer]. [address] [place] [address], [place] [address]. All rights reserved. This information is not intended as a substitute for professional medical care. Always follow your healthcare professional's instructions.

## 2018-12-03 NOTE — LETTER
12/3/2018         RE: Amelia Canavan  [address] [facility] [address]        Dear Colleague,    Thank you for referring your patient, Amelia Canavan, to the [facility] ORTHOPEDIC SURGERY. Please see a copy of my visit note below.    HISTORY OF PRESENT ILLNESS:    Amelia Canavan is a 43 year old female who is seen in consultation at the request of Dr. Lopez for bilateral  hip pain.    Patient states that she has a history of bad, and painful knees. (loose of cartilage and bursitis).  In the last 5 months, increased hip pain in both hips for unknown reason.  No trama noted.  She has not noted consistent pain pattern with a Latin dance move where the right pelvis rises up.  The pain is deep inside in the anterior superior aspect of the hip.  She does not have radiculopathy symptoms.  Present symptoms: pain in anterior joint line and some posterior pelvis/. SI area. No radicular symptoms. Occasional weakness noted, when taking a step and has to grab a support because she feels like she will fall.   Pain level 2/10.  Better: unknown, Worse: with sitting for > 20 min, a night of dancel class.   In most addition to the hip issues, she has been concerned about knee problems.  She feels even after losing 150 pounds of weight, her knee pain has not changed significantly.  She has noted significant crepitus in both knees.  Treatments tried to this point: Advil  Orthopedic PMH: none  Left foot issue seeing MD tomorrow for heel pain.     Past Medical History:   Diagnosis Date     Cervical high risk HPV (human papillomavirus) test positive 10/27/16, 11/27/17    (not 16 or 18)     DM type 2, goal A1c below 7 9/6/2013     Hyperlipidemia LDL goal < 100 9/6/2013     Obese 8/30/2013     PCOS (polycystic ovarian syndrome) 8/30/2013       Past Surgical History:   Procedure Laterality Date     COLPOSCOPY, BIOPSY, COMBINED  2003     LEEP TX, CERVICAL  2003       Family History   Problem Relation Age of Onset      Hypertension Mother      Heart Disease Maternal Grandfather      Family History Negative Father        Social History     Social History     Marital status: Single     Spouse name: N/A     Number of children: N/A     Years of education: N/A     Occupational History     Not on file.     Social History Main Topics     Smoking status: Never Smoker     Smokeless tobacco: Never Used      Comment: Maybe once every 2 months     Alcohol use 2.4 oz/week     4 Standard drinks or equivalent per week      Comment: 5 drinks per week     Drug use: No     Sexual activity: Yes     Partners: Male     Other Topics Concern     Not on file     Social History Narrative       Current Outpatient Prescriptions   Medication Sig Dispense Refill     buPROPion (WELLBUTRIN XL) 150 MG 24 hr tablet Take 1 tablet (150 mg) by mouth daily 90 tablet 1     ibuprofen (ADVIL,MOTRIN) 600 MG tablet Take 1 tablet (600 mg) by mouth every 6 hours as needed for pain 30 tablet 1     lamoTRIgine (LAMICTAL) 150 MG tablet Take 150 mg by mouth daily  1     triamcinolone (KENALOG) 0.1 % cream Apply sparingly to affected area three times daily as needed 80 g 0     valACYclovir (VALTREX) 1000 mg tablet Take 2 tablets (2,000 mg) by mouth 2 times daily 30 tablet 0     blood glucose monitoring (ACCU-CHEK FASTCLIX) lancets 1 each 2 times daily (Patient not taking: Reported on 10/4/2018) 3 Box 4     blood glucose monitoring (NO BRAND SPECIFIED) test strip 1 strip by In Vitro route every other day (Patient not taking: Reported on 10/4/2018) 3 Box 1     Flaxseed, Linseed, (FLAXSEED OIL) 1000 MG CAPS Take 1 capsule by mouth daily       metroNIDAZOLE (FLAGYL) 500 MG tablet Take 1 tablet (500 mg) by mouth 2 times daily (Patient not taking: Reported on 12/3/2018) 14 tablet 0       Allergies   Allergen Reactions     Sulfa Drugs Anaphylaxis       REVIEW OF SYSTEMS:  CONSTITUTIONAL:  NEGATIVE for fever, chills, change in weight  INTEGUMENTARY/SKIN:  NEGATIVE for worrisome rashes,  "moles or lesions  EYES:  NEGATIVE for vision changes or irritation  ENT/MOUTH:  NEGATIVE for ear, mouth and throat problems  RESP:  NEGATIVE for significant cough or SOB  BREAST:  NEGATIVE for masses, tenderness or discharge  CV:  NEGATIVE for chest pain, palpitations or peripheral edema  GI:  NEGATIVE for nausea, abdominal pain, heartburn, or change in bowel habits  :  Negative   MUSCULOSKELETAL:  See HPI above  NEURO:  NEGATIVE for weakness, dizziness or paresthesias  ENDOCRINE:  NEGATIVE for temperature intolerance, skin/hair changes  HEME/ALLERGY/IMMUNE:  NEGATIVE for bleeding problems  PSYCHIATRIC:  NEGATIVE for changes in mood or affect, positive for depression      PHYSICAL EXAM:  /80  Ht 5' 10\" (1.778 m)  Wt 210 lb (95.3 kg)  BMI 30.13 kg/m2  Body mass index is 30.13 kg/(m^2).   GENERAL APPEARANCE: healthy, alert and no distress   HEENT: No apparent thyroid megaly. Clear sclera with normal ocular movement  RESPIRATORY: No labored breathing  SKIN: no suspicious lesions or rashes  NEURO: Normal strength and tone, mentation intact and speech normal  VASCULAR: Good pulses, and capillary refill   LYMPH: no lymphadenopathy   PSYCH:  mentation appears normal and affect normal/bright    MUSCULOSKELETAL:  She is able to get up from sitting quite readily  No specific limping  Range of motion is near full and symmetrical  No obvious impingement sign with flexion and internal rotation as well as extension and external rotation bilaterally  Straight leg raising is negative  Femoral stretch test is negative  Lateral abduction causing more pain on the left than right  Internal rotation is really not particularly associated with pain, bilateral  She tolerates hopping on one leg at a time quite well    Knee range of motion is full  Significant patellofemoral crepitus  Significant pain with a patellofemoral compression, bilateral  Ligaments are stable throughout the knee    Skin is intact throughout the lower " extremities  Gross motor function is full       ASSESSMENT:  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  Presence of excess coverage of the acetabulum and joint space narrowing in the posterior aspect of the hip joint for the left side as well as joint space narrowing of the very lateral joint was pointed out.  Whether the pain is from impingement or from DJD is not clear.  Initially as a nonoperative treatment option, a cortisone injection for therapeutic as well as diagnostic purpose was felt to be reasonable.  She will be set up for left hip cortisone injection under ultrasound guidance.    We also talked about patellofemoral pain issue.  Continuing effort and weight loss is recommended.  Daily exercise biking is also recommended along with over-the-counter medications.    We also talked about potential benefit of cortisone injection to the knees and arthroscopy debridement.  Once we isolate the source of pain from the hip joint, we will address the knee problem further in the future..    Imaging Interpretation:   AP pelvis and lateral left hip demonstrate presence of excess coverage of the acetabulum causing a protrusio situation.  The post hips have slight joint space narrowing in the medial joint space and also posterior joint space for the left hip.  Otherwise, no acute fractures or other osseous pathology are noted.    Francisco Javier Ag MD  Department of Orthopedic Surgery        Disclaimer: This note consists of symbols derived from keyboarding, dictation and/or voice recognition software. As a result, there may be errors in the script that have gone undetected. Please consider this when interpreting information found in this chart.    Again, thank you for allowing me to participate in the care of your patient.        Sincerely,        Francisco Javier  Chantel Ag MD

## 2018-12-03 NOTE — PROGRESS NOTES
HISTORY OF PRESENT ILLNESS:    Amelia Canavan is a 43 year old female who is seen in consultation at the request of Dr. Lopez for bilateral  hip pain.    Patient states that she has a history of bad, and painful knees. (loose of cartilage and bursitis).  In the last 5 months, increased hip pain in both hips for unknown reason.  No trama noted.  She has not noted consistent pain pattern with a Latin dance move where the right pelvis rises up.  The pain is deep inside in the anterior superior aspect of the hip.  She does not have radiculopathy symptoms.  Present symptoms: pain in anterior joint line and some posterior pelvis/. SI area. No radicular symptoms. Occasional weakness noted, when taking a step and has to grab a support because she feels like she will fall.   Pain level 2/10.  Better: unknown, Worse: with sitting for > 20 min, a night of dancel class.   In most addition to the hip issues, she has been concerned about knee problems.  She feels even after losing 150 pounds of weight, her knee pain has not changed significantly.  She has noted significant crepitus in both knees.  Treatments tried to this point: Advil  Orthopedic PMH: none  Left foot issue seeing MD tomorrow for heel pain.     Past Medical History:   Diagnosis Date     Cervical high risk HPV (human papillomavirus) test positive 10/27/16, 11/27/17    (not 16 or 18)     DM type 2, goal A1c below 7 9/6/2013     Hyperlipidemia LDL goal < 100 9/6/2013     Obese 8/30/2013     PCOS (polycystic ovarian syndrome) 8/30/2013       Past Surgical History:   Procedure Laterality Date     COLPOSCOPY, BIOPSY, COMBINED  2003     LEEP TX, CERVICAL  2003       Family History   Problem Relation Age of Onset     Hypertension Mother      Heart Disease Maternal Grandfather      Family History Negative Father        Social History     Social History     Marital status: Single     Spouse name: N/A     Number of children: N/A     Years of education: N/A     Occupational  History     Not on file.     Social History Main Topics     Smoking status: Never Smoker     Smokeless tobacco: Never Used      Comment: Maybe once every 2 months     Alcohol use 2.4 oz/week     4 Standard drinks or equivalent per week      Comment: 5 drinks per week     Drug use: No     Sexual activity: Yes     Partners: Male     Other Topics Concern     Not on file     Social History Narrative       Current Outpatient Prescriptions   Medication Sig Dispense Refill     buPROPion (WELLBUTRIN XL) 150 MG 24 hr tablet Take 1 tablet (150 mg) by mouth daily 90 tablet 1     ibuprofen (ADVIL,MOTRIN) 600 MG tablet Take 1 tablet (600 mg) by mouth every 6 hours as needed for pain 30 tablet 1     lamoTRIgine (LAMICTAL) 150 MG tablet Take 150 mg by mouth daily  1     triamcinolone (KENALOG) 0.1 % cream Apply sparingly to affected area three times daily as needed 80 g 0     valACYclovir (VALTREX) 1000 mg tablet Take 2 tablets (2,000 mg) by mouth 2 times daily 30 tablet 0     blood glucose monitoring (ACCU-CHEK FASTCLIX) lancets 1 each 2 times daily (Patient not taking: Reported on 10/4/2018) 3 Box 4     blood glucose monitoring (NO BRAND SPECIFIED) test strip 1 strip by In Vitro route every other day (Patient not taking: Reported on 10/4/2018) 3 Box 1     Flaxseed, Linseed, (FLAXSEED OIL) 1000 MG CAPS Take 1 capsule by mouth daily       metroNIDAZOLE (FLAGYL) 500 MG tablet Take 1 tablet (500 mg) by mouth 2 times daily (Patient not taking: Reported on 12/3/2018) 14 tablet 0       Allergies   Allergen Reactions     Sulfa Drugs Anaphylaxis       REVIEW OF SYSTEMS:  CONSTITUTIONAL:  NEGATIVE for fever, chills, change in weight  INTEGUMENTARY/SKIN:  NEGATIVE for worrisome rashes, moles or lesions  EYES:  NEGATIVE for vision changes or irritation  ENT/MOUTH:  NEGATIVE for ear, mouth and throat problems  RESP:  NEGATIVE for significant cough or SOB  BREAST:  NEGATIVE for masses, tenderness or discharge  CV:  NEGATIVE for chest pain,  "palpitations or peripheral edema  GI:  NEGATIVE for nausea, abdominal pain, heartburn, or change in bowel habits  :  Negative   MUSCULOSKELETAL:  See HPI above  NEURO:  NEGATIVE for weakness, dizziness or paresthesias  ENDOCRINE:  NEGATIVE for temperature intolerance, skin/hair changes  HEME/ALLERGY/IMMUNE:  NEGATIVE for bleeding problems  PSYCHIATRIC:  NEGATIVE for changes in mood or affect, positive for depression      PHYSICAL EXAM:  /80  Ht 5' 10\" (1.778 m)  Wt 210 lb (95.3 kg)  BMI 30.13 kg/m2  Body mass index is 30.13 kg/(m^2).   GENERAL APPEARANCE: healthy, alert and no distress   HEENT: No apparent thyroid megaly. Clear sclera with normal ocular movement  RESPIRATORY: No labored breathing  SKIN: no suspicious lesions or rashes  NEURO: Normal strength and tone, mentation intact and speech normal  VASCULAR: Good pulses, and capillary refill   LYMPH: no lymphadenopathy   PSYCH:  mentation appears normal and affect normal/bright    MUSCULOSKELETAL:  She is able to get up from sitting quite readily  No specific limping  Range of motion is near full and symmetrical  No obvious impingement sign with flexion and internal rotation as well as extension and external rotation bilaterally  Straight leg raising is negative  Femoral stretch test is negative  Lateral abduction causing more pain on the left than right  Internal rotation is really not particularly associated with pain, bilateral  She tolerates hopping on one leg at a time quite well    Knee range of motion is full  Significant patellofemoral crepitus  Significant pain with a patellofemoral compression, bilateral  Ligaments are stable throughout the knee    Skin is intact throughout the lower extremities  Gross motor function is full       ASSESSMENT:  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  Presence of excess coverage of the acetabulum and joint space narrowing in the posterior aspect of the hip joint for the left side as well as joint space narrowing of the very lateral joint was pointed out.  Whether the pain is from impingement or from DJD is not clear.  Initially as a nonoperative treatment option, a cortisone injection for therapeutic as well as diagnostic purpose was felt to be reasonable.  She will be set up for left hip cortisone injection under ultrasound guidance.    We also talked about patellofemoral pain issue.  Continuing effort and weight loss is recommended.  Daily exercise biking is also recommended along with over-the-counter medications.    We also talked about potential benefit of cortisone injection to the knees and arthroscopy debridement.  Once we isolate the source of pain from the hip joint, we will address the knee problem further in the future..    Imaging Interpretation:   AP pelvis and lateral left hip demonstrate presence of excess coverage of the acetabulum causing a protrusio situation.  The post hips have slight joint space narrowing in the medial joint space and also posterior joint space for the left hip.  Otherwise, no acute fractures or other osseous pathology are noted.    Francisco Javier Ag MD  Department of Orthopedic Surgery        Disclaimer: This note consists of symbols derived from keyboarding, dictation and/or voice recognition software. As a result, there may be errors in the script that have gone undetected. Please consider this when interpreting information found in this chart.

## 2018-12-04 ENCOUNTER — RADIANT APPOINTMENT (OUTPATIENT)
Dept: GENERAL RADIOLOGY | Facility: CLINIC | Age: 43
End: 2018-12-04
Attending: PODIATRIST
Payer: COMMERCIAL

## 2018-12-04 ENCOUNTER — OFFICE VISIT (OUTPATIENT)
Dept: PODIATRY | Facility: CLINIC | Age: 43
End: 2018-12-04
Payer: COMMERCIAL

## 2018-12-04 VITALS — DIASTOLIC BLOOD PRESSURE: 68 MMHG | SYSTOLIC BLOOD PRESSURE: 118 MMHG

## 2018-12-04 DIAGNOSIS — Q66.70 PES CAVUS: ICD-10-CM

## 2018-12-04 DIAGNOSIS — M76.62 ACHILLES TENDINITIS OF LEFT LOWER EXTREMITY: ICD-10-CM

## 2018-12-04 DIAGNOSIS — M79.672 LEFT FOOT PAIN: ICD-10-CM

## 2018-12-04 DIAGNOSIS — M77.32 HEEL SPUR, LEFT: ICD-10-CM

## 2018-12-04 DIAGNOSIS — M21.6X2 EQUINUS DEFORMITY OF LEFT FOOT: ICD-10-CM

## 2018-12-04 DIAGNOSIS — M79.672 LEFT FOOT PAIN: Primary | ICD-10-CM

## 2018-12-04 PROCEDURE — 99243 OFF/OP CNSLTJ NEW/EST LOW 30: CPT | Performed by: PODIATRIST

## 2018-12-04 PROCEDURE — 73650 X-RAY EXAM OF HEEL: CPT | Mod: LT

## 2018-12-04 NOTE — MR AVS SNAPSHOT
After Visit Summary   12/4/2018    Amelia Canavan    MRN: [id number]           Patient Information     Date Of Birth          1975        Visit Information        Provider Department      12/4/2018 1:15 PM Juliana Del Rio DPM, Podiatry/Foot and Ankle Surgery [facility]        Today's Diagnoses     Left foot pain    -  1      Care Instructions    Thank you for choosing [facility]!    DR. DEL RIO'S CLINIC SCHEDULE  MONDAY AM - [place] TUESDAY - [place]   [address] [address] [place] [address] [place] [address]   [phone] / [phone] / [phone]       WEDNESDAY - [place] FRIDAY AM - [facility]   [address]   [place] [address] [place] [address]   [phone] / [phone]       FRIDAY PM - [place] SCHEDULE SURGERY: [phone]   [address] BILLING QUESTIONS: [phone]   [place] [address] AFTER HOURS: [phone]   [phone] / [phone] APPOINTMENTS: [phone]     Consumer Price Line (CPL) [phone]       TENDONITIS   Tendons are the strong fibrous portions ofmuscles that attach to bones and allow the muscle to move a joint when it contracts. Tendons are very strong because they have a lot of force exerted on them. Sometimes tendons can become painful because they have suffered an acute injury, in which too much force was exerted at one time, or an overuse injury, in which a normal force was exerted too frequently or over a prolonged period of time. As a result, there is damage to the tendon and its surrounding soft tissue structures and they become inflammed. Because tendons do not have a great blood supply, they do not heal rapidly and the inflammation can become chronic.   Conservative treatment for tendinitis involves rest and anti-inflammatory measures. Ice is applied 15 minutes 2-3 times daily.  Anti-inflammatory medications called NSAIDs (ibuprofen, example) can be taken provided they are used with caution, as they can lead to internal bleeding and increase the risk ofstroke and heart attack. Sometimes topical nitroglycerin is prescribed to help with pain. Often your doctor will use a special shoe or removable walking cast to immobilize the tendon, allowing it to heal without further damage from use. These devices are very useful in helping tendons heal, but they may slow you down or make you feel like your hip, knee, or back are out ofalignment. This is temporary and should go away once you are out ofthe immobilization. You should not use a walking cast when showering or driving. Another option is Platelet Rich Plasma injections. (Normally done with a Sports and Orthorapedic doctor.   If conservative measures fail, your physician may need to surgically repair the tendon by removing any chronic inflammatory tissue and sewing it back together. Sometimes it is sewn to an adjacent tendon with similar function for support and sometimes it is lengthened. . Sometimes the bones around the tendon need to be realigned or reshaped to better support the tendon or prevent further damage. Your foot and ankle surgeon will discuss the specifics of your surgery with you, should you need it.    Towel stretch: Sit on a hard surface with your injured leg stretched out in front of you. Loop a towel around your toes and the ball of your foot and pull the towel toward your body keeping your leg straight. Hold this position for 15 to 30 seconds and then relax. Repeat 3 times. Then push the towel away with the ball of your foot. Repeat 3 times.  When you don't feel much of a stretch using the towel, you can start the standing calf stretch and the following exercises.  Standing calf stretch: Stand facing a wall with your hands on the wall at about eye level. Keep your injured leg back with your heel on the floor. Keep the other  leg forward with the knee bent. Turn your back foot slightly inward (as if you were pigeon-toed). Slowly lean into the wall until you feel a stretch in the back of your calf. Hold the stretch for 15 to 30 seconds. Return to the starting position. Repeat 3 times. Do this exercise several times each day.   Standing soleus stretch: Stand facing a wall with your hands on the wall at about chest height. Keep your injured leg back with your heel on the floor. Keep the other leg forward with the knee bent. Turn your back foot slightly inward (as if you were pigeon-toed). Bend your back knee slightly and gently lean into the wall until you feel a stretch in the lower calf of your injured leg. Hold the stretch for 15 to 30 seconds. Return to the starting position. Repeat 3 times.   Achilles stretch: Stand with the ball of one foot on a stair. Reach for the step below with your heel until you feel a stretch in the arch of your foot. Hold this position for 15 to 30 seconds and then relax. Repeat 3 times.   Heel raise: Balance yourself while standing behind a chair or counter. Using the chair or counter as a support to help you, raise your body up onto your toes and hold for 5 seconds. Then slowly lower yourself down without holding onto the support. (It's OK to keep holding onto the support if you need to.) When this exercise becomes less painful, try lowering yourself down on the injured leg only. Repeat 15 times. Do 2 sets of 15. Rest 30 seconds between sets.   Step-up: Stand with the foot of your injured leg on a support 3 to 5 inches high (like a small step or block of wood). Keep your other foot flat on the floor. Shift your weight onto the injured leg on the support. Straighten your injured leg as the other leg comes off the floor. Return to the starting position by bending your injured leg and slowly lowering your uninjured leg back to the floor. Do 2 sets of 15.   Resisted ankle eversion: Sit with both legs stretched  out in front of you, with your feet about a shoulder's width apart. Tie a loop in one end of elastic tubing. Put the foot of your injured leg through the loop so that the tubing goes around the arch of that foot and wraps around the outside of the other foot. Hold onto the other end of the tubing with your hand to provide tension. Turn the foot of your injured leg up and out. Make sure you keep your other foot still so that it will allow the tubing to stretch as you move the foot of your injured leg. Return to the starting position. Do 2 sets of 15.   Balance and reach exercises: Stand next to a chair with your injured leg farther from the chair. The chair will provide support if you need it. Stand on the foot of your injured leg and bend your knee slightly. Try to raise the arch of this foot while keeping your big toe on the floor. Keep your foot in this position. With the hand that is farther away from the chair, reach forward in front of you by bending at the waist. Avoid bending your knee any more as you do this. Repeat this 10 times. To make the exercise more challenging, reach farther in front of you. Do 2 sets of 10.  the same position as above. While keeping your arch height, reach the hand that is farther away from the chair across your body toward the chair. The farther you reach, the more challenging the exercise. Do 2 sets of 10.     Resisted ankle eversion: Sit with both legs stretched out in front of you, with your feet about a shoulder's width apart. Tie a loop in one end of elastic tubing. Put the foot of your injured leg through the loop so that the tubing goes around the arch of that foot and wraps around the outside of the other foot. Hold onto the other end of the tubing with your hand to provide tension. Turn the foot of your injured leg up and out. Make sure you keep your other foot still so that it will allow the tubing to stretch as you move the foot of your injured leg. Return to the  "starting position. Do 2 sets of 15.   If you have access to a wobble board, do the following exercises:  Wobble board exercises:   Stand on a wobble board with your feet shoulder width apart. Rock the board forwards and backwards 30 times, then side to side 30 times. Hold on to a chair if you need support.   Rotate the wobble board around so that the edge of the board is in contact with the floor at all times. Do this 30 times in a clockwise and then a counterclockwise direction.   Balance on the wobble board for as long as you can without letting the edges touch the floor. Try to do this for 2 minutes without touching the floor.   Rotate the wobble board in clockwise and counterclockwise circles, but do not let the edge of the board touch the floor.   When you have mastered exercises A through D, try repeating them while standing on just your injured leg.   After you are able to do these exercises on one leg, try to do them with your eyes closed. Make sure you have something nearby to support you in case you lose your balance.    BONE SPUR  A bone spur (osteophyte) is a bony growth formed on normal bone. Most people think of something sharp when they think of a \"spur,\" but a bone spur is just extra bone. It s usually smooth, but it can cause wear and tear or pain if it presses or rubs on other bones or soft tissues such as ligaments, tendons, or nerves in the body. Common places for bone spurs include the spine, shoulders, hands, hips, knees, and feet.  CAUSES  A bone spur forms as the body tries to repair itself by building extra bone. It generally forms in response to pressure, rubbing, or stress that continues over a long period of time.  Some bone spurs form as part of the aging process. As we age, the slippery tissue called cartilage that covers the ends of the bones within joints breaks down and eventually wears away (osteoarthritis). Over time, this leads to pain and swelling and, in some cases, bone spurs " "forming along the edges of the joint. Bone spurs due to aging are especially common in the joints of the spine and feet.  Bone spurs also form in the feet in response to tight ligaments, to activities such as dancing and running that put stress on the feet, and to pressure from being overweight or from poorly fitting shoes. For example, the long ligament on the bottom of the foot (plantar fascia) can become stressed or tight and pull on the heel, causing the ligament to become inflamed (plantar fasciitis). As the bone tries to mend itself, a bone spur can form on the bottom of the heel (known as a \"heel spur\"). Pressure at the back of the heel from frequently wearing shoes that are too tight can cause a bone spur on the back of the heel. This is sometimes called a \"pump bump,\" because it is often seen in women who wear high heels.  SYMPTOMS  Many people have bone spurs without ever knowing it, because most bone spurs cause no symptoms. But if the bone spurs are pressing on other bones or tissues or are causing a muscle or tendon to rub, they can break that tissue down over time, causing swelling, pain, and tearing. Bone spurs in the foot can also cause corns and calluses when tissue builds up to provide added padding over the bone spur.  DIAGNOSIS  A bone spur is usually visible on an X-ray. But since most bone spurs do not cause problems, it would be unusual to take an X-ray just to see whether you have a bone spur. If you had an X-ray to evaluate one of the problems associated with bone spurs, such as arthritis, bone spurs would be visible on that X-ray.  TREATMENT  Bone spurs do not require treatment unless they are causing pain or damaging other tissues. When needed, treatment may be directed at the causes, the symptoms, or the bone spurs themselves.  Treatment directed at the cause of bone spurs may include weight loss to take some pressure off the joints (especially when osteoarthritis or plantar fasciitis is " the cause) and stretching the affected area, such as the heel cord and bottom of the foot. Seeing a physical therapist for ultrasound or deep tissue massage may be helpful for plantar fasciitis or shoulder pain.  Treatment directed at symptoms could include rest, ice, stretching, and nonsteroidal anti-inflammatory drugs (NSAIDs) such as ibuprofen. Education in how to protect your joints is helpful if you have osteoarthritis. If a bone spur is in your foot, changing footwear or adding padding or a shoe insert such as a heel cup or orthotic may help. If the bone spur is causing corns or calluses, padding the area or wearing different shoes can help. A podiatrist (foot doctor) may be consulted if corns and calluses become a bigger problem. If the bone spur continues to cause symptoms, your doctor may suggest a corticosteroid injection at the painful area to decrease pain and inflammation of the soft tissues next to the bone spur.  Sometimes the bone spurs themselves are treated. Bone spurs can be surgically removed or treated as part of a surgery to repair or replace a joint when osteoarthritis has caused considerable damage and deformity. Examples might include repair of a bunion or heel spur in the foot. At other times, fusion of a joint may be warrented to decrease pain.      POTENTIAL COMPLICATIONS OF FOOT & ANKLE SURGERY  Undergoing a surgical procedure involves a certain amount of risk. Risks of complications vary depending on the complexity of the surgery and how you take care of the surgical area during the healing process. Complications can range from minor infection to death. Some complications are temporary while others will be permanent.  Your surgeon weighs the risk of complications vs the potential benefit of undergoing surgery. You need to consider your tolerance for unexpected problems as you decide whether to undergo surgery.    Foot and Ankle surgery involves cutting skin, bone, ligaments, blood  vessels and joints.  These structures heal well but not without consequence. Any break to the skin can lead to infection. A deep infection involves bones or joints which can be devastating. Deep infection can lead to amputation or could spread to other parts of your body. Most infections are minor and easily treated with oral antibiotics. Infections are often times from bacteria already present on your skin. Proper care of the surgical site is an essential component of avoiding infection. Do not get the bandage wet and take proper care of external pins to avoid these problems.     Joint stiffness is inherent to any foot or ankle surgery. Joint surgery is a major component of reconstructive foot and ankle procedures. The ligaments and tissues around the joint are cut, and later repaired. Scare tissue limits joint mobility. This can be permanent but generally improves over the course of one year.    Surgery involves dissection around nerves. Visible nerves are moved out of the way while very small nerves are cut. Scar tissue develops around nerves and can lead to nerve pain, numbness, or neuromas. Nerve symptoms can be permanent. This can lead to numbness or sometimes hypersensitivity to touch and problems wearing shoes.    Bones do not always heal after surgery. Poor healing after a bone cut or joint fusion can lead to an extended period of casting or repeat surgery. Electronic bone stimulators are sometimes used to stimulate poor healing of bone. Nonunion is when joint fusion does not take.  This can occur as often as 10% of the time. Smoking doubles your risk of poor bone healing to 20%.    Bone grafting is sometimes necessary during the original or subsequent surgery. Bone is sometimes taken from other parts of your body or freeze dried bone from a bone bank from a bone bank or synthetic bone material might be used.    A scar is always present after foot and ankle surgery. The scar will be visible and could be  sensitive. Some people develop excessive scarring, which cannot be controlled by the surgeon. Scars can be unsightly and can restrict joint mobility.    Blood clots can develop in the calf after surgery. Foot and ankle surgery is a predisposing factor for blood clots. The blood clot could break and travel to your lung.  This condition can lead to death. Early warning signs could include calf swelling and pain, chest pain or shortness of breath. This is an emergency that requires immediate attention by a medical doctor!    Surgery will not necessarily create a pain-free foot. Even normal feet hurt. Crooked toes, bunions, neuromas, flat feet and arthritis should all be considered permanent conditions.  Ankle pain commonly requires multiple surgeries over a lifetime. Do not assume that having surgery will permanently fix your condition. You may need permanent alteration in shoes and activities to accommodate your foot and ankle problem.    Careful attention to post-operative recommendations will dramatically reduce your risk of complications. Proper dressing, wound care, elevation and rest will be essential to get the wound healed and minimize scarring. Strict attention to activity restrictions, such as non-weight bearing, or partial weight bearing is essential. Internal fixation devices may not resist the stress of walking. Some select surgeries allow the patient to walk, however this should be very minimal.    Despite these concerns, foot and ankle surgery leads to a high level of patient satisfaction. Your surgeon would not recommend surgery if he/she did not expect your foot to improve. Talk to your surgeon about any of the above issues.      Body Mass Index (BMI)  Many things can cause foot and ankle problems. Foot structure, activity level, foot mechanics and injuries are common causes of pain.  One very important issue that often goes unmentioned, is body weight. Extra weight can cause increased stress on  muscles, ligaments, bones and tendons.  Sometimes just a few extra pounds is all it takes to put one over her/his threshold. Without reducing that stress, it can be difficult to alleviate pain. Some people are uncomfortable addressing this issue, but we feel it is important for you to think about it. As Foot &  Ankle specialists, our job is addressing the lower extremity problem and possible causes. Regarding extra body weight, we encourage patients to discuss diet and weight management plans with their primary care doctors. It is this team approach that gives you the best opportunity for pain relief and getting you back on your feet.                  Follow-ups after your visit        Your next 10 appointments already scheduled     Dec 06, 2018 11:20 AM CST   New Visit with Albert Yeo, MD   [facility] ([facility])    [address] [address] [address]   [phone]              Who to contact     If you have questions or need follow up information about today's clinic visit or your schedule please contact [facility] directly at [phone].  Normal or non-critical lab and imaging results will be communicated to you by [employer], letter or phone within 4 business days after the clinic has received the results. If you do not hear from us within 7 days, please contact the clinic through [employer] or phone. If you have a critical or abnormal lab result, we will notify you by phone as soon as possible.  Submit refill requests through [employer] or call your pharmacy and they will forward the refill request to us. Please allow 3 business days for your refill to be completed.          Additional Information About Your Visit        [employer] Information     [employer] gives you secure access to your electronic health record. If you see a primary care provider, you can also send messages to your care team and make appointments. If you have questions,  please call your primary care clinic.  If you do not have a primary care provider, please call [phone] and they will assist you.        Care EveryWhere ID     This is your Care EveryWhere ID. This could be used by other organizations to access your [facility] medical records  [id number]         Blood Pressure from Last 3 Encounters:   12/04/18 118/68   12/03/18 122/80   10/04/18 133/85    Weight from Last 3 Encounters:   12/03/18 210 lb (95.3 kg)   10/04/18 218 lb 4.8 oz (99 kg)   07/23/18 219 lb 14.4 oz (99.7 kg)              Today, you had the following     No orders found for display       Primary Care Provider Office Phone # Fax #    Connie Lopez [phone] [address] [facility] [address]        Equal Access to Services     [facility]: Hadii aad ku hadasho Sobrianali, waaxda luqadaha, qaybta kaalmada adeegyada, waxay mookiein haykatlynn matty banerjee . So [facility] [phone].    ATENCIÓN: Si habla español, tiene a oates disposición servicios gratuitos de asistencia lingüística. Rafaela al [phone].    We comply with applicable federal civil rights laws and [place] laws. We do not discriminate on the basis of race, color, national origin, age, disability, sex, sexual orientation, or gender identity.            Thank you!     Thank you for choosing [facility]  for your care. Our goal is always to provide you with excellent care. Hearing back from our patients is one way we can continue to improve our services. Please take a few minutes to complete the written survey that you may receive in the mail after your visit with us. Thank you!             Your Updated Medication List - Protect others around you: Learn how to safely use, store and throw away your medicines at www.disposemymeds.org.          This list is accurate as of 12/4/18  1:41 PM.  Always use your most recent med list.                   Brand Name Dispense Instructions for use Diagnosis     blood glucose monitoring lancets     3 Box    1 each 2 times daily    Diabetes mellitus type II, controlled, with no complications (H)       blood glucose monitoring test strip    NO BRAND SPECIFIED    3 Box    1 strip by In Vitro route every other day    Diabetes mellitus type II, controlled, with no complications (H)       buPROPion 150 MG 24 hr tablet    WELLBUTRIN XL    90 tablet    Take 1 tablet (150 mg) by mouth daily    Major depressive disorder, recurrent episode, moderate (H)       flaxseed oil 1000 MG Caps      Take 1 capsule by mouth daily        ibuprofen 600 MG tablet    ADVIL/MOTRIN    30 tablet    Take 1 tablet (600 mg) by mouth every 6 hours as needed for pain    Pelvic pain in female       lamoTRIgine 150 MG tablet    LaMICtal     Take 150 mg by mouth daily        metroNIDAZOLE 500 MG tablet    FLAGYL    14 tablet    Take 1 tablet (500 mg) by mouth 2 times daily    Bacterial vaginosis       triamcinolone 0.1 % external cream    KENALOG    80 g    Apply sparingly to affected area three times daily as needed    Eczema       valACYclovir 1000 mg tablet    VALTREX    30 tablet    Take 2 tablets (2,000 mg) by mouth 2 times daily    Recurrent cold sores

## 2018-12-04 NOTE — PATIENT INSTRUCTIONS
Thank you for choosing [facility]!    DR. LEGER'S CLINIC SCHEDULE  MONDAY AM - [place] TUESDAY - [place]   [address] [address] [address] [address] [place] [address]   [phone] / [phone] / [phone]       WEDNESDAY - [place] FRIDAY AM - [facility]   [address]   [place] [address] [place] [address]   [phone] / [phone]       FRIDAY PM - [place] SCHEDULE SURGERY: [phone]   [address] BILLING QUESTIONS: [phone]   [place] [address] AFTER HOURS: [phone]   [phone] / [phone] APPOINTMENTS: [phone]     Consumer Price Line (CPL) [phone]       TENDONITIS   Tendons are the strong fibrous portions ofmuscles that attach to bones and allow the muscle to move a joint when it contracts. Tendons are very strong because they have a lot of force exerted on them. Sometimes tendons can become painful because they have suffered an acute injury, in which too much force was exerted at one time, or an overuse injury, in which a normal force was exerted too frequently or over a prolonged period of time. As a result, there is damage to the tendon and its surrounding soft tissue structures and they become inflammed. Because tendons do not have a great blood supply, they do not heal rapidly and the inflammation can become chronic.   Conservative treatment for tendinitis involves rest and anti-inflammatory measures. Ice is applied 15 minutes 2-3 times daily. Anti-inflammatory medications called NSAIDs (ibuprofen, example) can be taken provided they are used with caution, as they can lead to internal bleeding and increase the risk ofstroke and heart attack. Sometimes topical nitroglycerin is prescribed to help with pain. Often your doctor will use a special shoe or removable walking cast to immobilize the tendon, allowing it to heal without further  damage from use. These devices are very useful in helping tendons heal, but they may slow you down or make you feel like your hip, knee, or back are out ofalignment. This is temporary and should go away once you are out ofthe immobilization. You should not use a walking cast when showering or driving. Another option is Platelet Rich Plasma injections. (Normally done with a Sports and Orthorapedic doctor.   If conservative measures fail, your physician may need to surgically repair the tendon by removing any chronic inflammatory tissue and sewing it back together. Sometimes it is sewn to an adjacent tendon with similar function for support and sometimes it is lengthened. . Sometimes the bones around the tendon need to be realigned or reshaped to better support the tendon or prevent further damage. Your foot and ankle surgeon will discuss the specifics of your surgery with you, should you need it.    Towel stretch: Sit on a hard surface with your injured leg stretched out in front of you. Loop a towel around your toes and the ball of your foot and pull the towel toward your body keeping your leg straight. Hold this position for 15 to 30 seconds and then relax. Repeat 3 times. Then push the towel away with the ball of your foot. Repeat 3 times.  When you don't feel much of a stretch using the towel, you can start the standing calf stretch and the following exercises.  Standing calf stretch: Stand facing a wall with your hands on the wall at about eye level. Keep your injured leg back with your heel on the floor. Keep the other leg forward with the knee bent. Turn your back foot slightly inward (as if you were pigeon-toed). Slowly lean into the wall until you feel a stretch in the back of your calf. Hold the stretch for 15 to 30 seconds. Return to the starting position. Repeat 3 times. Do this exercise several times each day.   Standing soleus stretch: Stand facing a wall with your hands on the wall at about chest  height. Keep your injured leg back with your heel on the floor. Keep the other leg forward with the knee bent. Turn your back foot slightly inward (as if you were pigeon-toed). Bend your back knee slightly and gently lean into the wall until you feel a stretch in the lower calf of your injured leg. Hold the stretch for 15 to 30 seconds. Return to the starting position. Repeat 3 times.   Achilles stretch: Stand with the ball of one foot on a stair. Reach for the step below with your heel until you feel a stretch in the arch of your foot. Hold this position for 15 to 30 seconds and then relax. Repeat 3 times.   Heel raise: Balance yourself while standing behind a chair or counter. Using the chair or counter as a support to help you, raise your body up onto your toes and hold for 5 seconds. Then slowly lower yourself down without holding onto the support. (It's OK to keep holding onto the support if you need to.) When this exercise becomes less painful, try lowering yourself down on the injured leg only. Repeat 15 times. Do 2 sets of 15. Rest 30 seconds between sets.   Step-up: Stand with the foot of your injured leg on a support 3 to 5 inches high (like a small step or block of wood). Keep your other foot flat on the floor. Shift your weight onto the injured leg on the support. Straighten your injured leg as the other leg comes off the floor. Return to the starting position by bending your injured leg and slowly lowering your uninjured leg back to the floor. Do 2 sets of 15.   Resisted ankle eversion: Sit with both legs stretched out in front of you, with your feet about a shoulder's width apart. Tie a loop in one end of elastic tubing. Put the foot of your injured leg through the loop so that the tubing goes around the arch of that foot and wraps around the outside of the other foot. Hold onto the other end of the tubing with your hand to provide tension. Turn the foot of your injured leg up and out. Make sure you  keep your other foot still so that it will allow the tubing to stretch as you move the foot of your injured leg. Return to the starting position. Do 2 sets of 15.   Balance and reach exercises: Stand next to a chair with your injured leg farther from the chair. The chair will provide support if you need it. Stand on the foot of your injured leg and bend your knee slightly. Try to raise the arch of this foot while keeping your big toe on the floor. Keep your foot in this position. With the hand that is farther away from the chair, reach forward in front of you by bending at the waist. Avoid bending your knee any more as you do this. Repeat this 10 times. To make the exercise more challenging, reach farther in front of you. Do 2 sets of 10.  the same position as above. While keeping your arch height, reach the hand that is farther away from the chair across your body toward the chair. The farther you reach, the more challenging the exercise. Do 2 sets of 10.     Resisted ankle eversion: Sit with both legs stretched out in front of you, with your feet about a shoulder's width apart. Tie a loop in one end of elastic tubing. Put the foot of your injured leg through the loop so that the tubing goes around the arch of that foot and wraps around the outside of the other foot. Hold onto the other end of the tubing with your hand to provide tension. Turn the foot of your injured leg up and out. Make sure you keep your other foot still so that it will allow the tubing to stretch as you move the foot of your injured leg. Return to the starting position. Do 2 sets of 15.   If you have access to a wobble board, do the following exercises:  Wobble board exercises:   Stand on a wobble board with your feet shoulder width apart. Rock the board forwards and backwards 30 times, then side to side 30 times. Hold on to a chair if you need support.   Rotate the wobble board around so that the edge of the board is in contact with  "the floor at all times. Do this 30 times in a clockwise and then a counterclockwise direction.   Balance on the wobble board for as long as you can without letting the edges touch the floor. Try to do this for 2 minutes without touching the floor.   Rotate the wobble board in clockwise and counterclockwise circles, but do not let the edge of the board touch the floor.   When you have mastered exercises A through D, try repeating them while standing on just your injured leg.   After you are able to do these exercises on one leg, try to do them with your eyes closed. Make sure you have something nearby to support you in case you lose your balance.    BONE SPUR  A bone spur (osteophyte) is a bony growth formed on normal bone. Most people think of something sharp when they think of a \"spur,\" but a bone spur is just extra bone. It s usually smooth, but it can cause wear and tear or pain if it presses or rubs on other bones or soft tissues such as ligaments, tendons, or nerves in the body. Common places for bone spurs include the spine, shoulders, hands, hips, knees, and feet.  CAUSES  A bone spur forms as the body tries to repair itself by building extra bone. It generally forms in response to pressure, rubbing, or stress that continues over a long period of time.  Some bone spurs form as part of the aging process. As we age, the slippery tissue called cartilage that covers the ends of the bones within joints breaks down and eventually wears away (osteoarthritis). Over time, this leads to pain and swelling and, in some cases, bone spurs forming along the edges of the joint. Bone spurs due to aging are especially common in the joints of the spine and feet.  Bone spurs also form in the feet in response to tight ligaments, to activities such as dancing and running that put stress on the feet, and to pressure from being overweight or from poorly fitting shoes. For example, the long ligament on the bottom of the foot (plantar " "fascia) can become stressed or tight and pull on the heel, causing the ligament to become inflamed (plantar fasciitis). As the bone tries to mend itself, a bone spur can form on the bottom of the heel (known as a \"heel spur\"). Pressure at the back of the heel from frequently wearing shoes that are too tight can cause a bone spur on the back of the heel. This is sometimes called a \"pump bump,\" because it is often seen in women who wear high heels.  SYMPTOMS  Many people have bone spurs without ever knowing it, because most bone spurs cause no symptoms. But if the bone spurs are pressing on other bones or tissues or are causing a muscle or tendon to rub, they can break that tissue down over time, causing swelling, pain, and tearing. Bone spurs in the foot can also cause corns and calluses when tissue builds up to provide added padding over the bone spur.  DIAGNOSIS  A bone spur is usually visible on an X-ray. But since most bone spurs do not cause problems, it would be unusual to take an X-ray just to see whether you have a bone spur. If you had an X-ray to evaluate one of the problems associated with bone spurs, such as arthritis, bone spurs would be visible on that X-ray.  TREATMENT  Bone spurs do not require treatment unless they are causing pain or damaging other tissues. When needed, treatment may be directed at the causes, the symptoms, or the bone spurs themselves.  Treatment directed at the cause of bone spurs may include weight loss to take some pressure off the joints (especially when osteoarthritis or plantar fasciitis is the cause) and stretching the affected area, such as the heel cord and bottom of the foot. Seeing a physical therapist for ultrasound or deep tissue massage may be helpful for plantar fasciitis or shoulder pain.  Treatment directed at symptoms could include rest, ice, stretching, and nonsteroidal anti-inflammatory drugs (NSAIDs) such as ibuprofen. Education in how to protect your joints is " helpful if you have osteoarthritis. If a bone spur is in your foot, changing footwear or adding padding or a shoe insert such as a heel cup or orthotic may help. If the bone spur is causing corns or calluses, padding the area or wearing different shoes can help. A podiatrist (foot doctor) may be consulted if corns and calluses become a bigger problem. If the bone spur continues to cause symptoms, your doctor may suggest a corticosteroid injection at the painful area to decrease pain and inflammation of the soft tissues next to the bone spur.  Sometimes the bone spurs themselves are treated. Bone spurs can be surgically removed or treated as part of a surgery to repair or replace a joint when osteoarthritis has caused considerable damage and deformity. Examples might include repair of a bunion or heel spur in the foot. At other times, fusion of a joint may be warrented to decrease pain.      POTENTIAL COMPLICATIONS OF FOOT & ANKLE SURGERY  Undergoing a surgical procedure involves a certain amount of risk. Risks of complications vary depending on the complexity of the surgery and how you take care of the surgical area during the healing process. Complications can range from minor infection to death. Some complications are temporary while others will be permanent.  Your surgeon weighs the risk of complications vs the potential benefit of undergoing surgery. You need to consider your tolerance for unexpected problems as you decide whether to undergo surgery.    Foot and Ankle surgery involves cutting skin, bone, ligaments, blood vessels and joints.  These structures heal well but not without consequence. Any break to the skin can lead to infection. A deep infection involves bones or joints which can be devastating. Deep infection can lead to amputation or could spread to other parts of your body. Most infections are minor and easily treated with oral antibiotics. Infections are often times from bacteria already present  on your skin. Proper care of the surgical site is an essential component of avoiding infection. Do not get the bandage wet and take proper care of external pins to avoid these problems.     Joint stiffness is inherent to any foot or ankle surgery. Joint surgery is a major component of reconstructive foot and ankle procedures. The ligaments and tissues around the joint are cut, and later repaired. Scare tissue limits joint mobility. This can be permanent but generally improves over the course of one year.    Surgery involves dissection around nerves. Visible nerves are moved out of the way while very small nerves are cut. Scar tissue develops around nerves and can lead to nerve pain, numbness, or neuromas. Nerve symptoms can be permanent. This can lead to numbness or sometimes hypersensitivity to touch and problems wearing shoes.    Bones do not always heal after surgery. Poor healing after a bone cut or joint fusion can lead to an extended period of casting or repeat surgery. Electronic bone stimulators are sometimes used to stimulate poor healing of bone. Nonunion is when joint fusion does not take.  This can occur as often as 10% of the time. Smoking doubles your risk of poor bone healing to 20%.    Bone grafting is sometimes necessary during the original or subsequent surgery. Bone is sometimes taken from other parts of your body or freeze dried bone from a bone bank from a bone bank or synthetic bone material might be used.    A scar is always present after foot and ankle surgery. The scar will be visible and could be sensitive. Some people develop excessive scarring, which cannot be controlled by the surgeon. Scars can be unsightly and can restrict joint mobility.    Blood clots can develop in the calf after surgery. Foot and ankle surgery is a predisposing factor for blood clots. The blood clot could break and travel to your lung.  This condition can lead to death. Early warning signs could include calf  swelling and pain, chest pain or shortness of breath. This is an emergency that requires immediate attention by a medical doctor!    Surgery will not necessarily create a pain-free foot. Even normal feet hurt. Crooked toes, bunions, neuromas, flat feet and arthritis should all be considered permanent conditions.  Ankle pain commonly requires multiple surgeries over a lifetime. Do not assume that having surgery will permanently fix your condition. You may need permanent alteration in shoes and activities to accommodate your foot and ankle problem.    Careful attention to post-operative recommendations will dramatically reduce your risk of complications. Proper dressing, wound care, elevation and rest will be essential to get the wound healed and minimize scarring. Strict attention to activity restrictions, such as non-weight bearing, or partial weight bearing is essential. Internal fixation devices may not resist the stress of walking. Some select surgeries allow the patient to walk, however this should be very minimal.    Despite these concerns, foot and ankle surgery leads to a high level of patient satisfaction. Your surgeon would not recommend surgery if he/she did not expect your foot to improve. Talk to your surgeon about any of the above issues.      Body Mass Index (BMI)  Many things can cause foot and ankle problems. Foot structure, activity level, foot mechanics and injuries are common causes of pain.  One very important issue that often goes unmentioned, is body weight. Extra weight can cause increased stress on muscles, ligaments, bones and tendons.  Sometimes just a few extra pounds is all it takes to put one over her/his threshold. Without reducing that stress, it can be difficult to alleviate pain. Some people are uncomfortable addressing this issue, but we feel it is important for you to think about it. As Foot &  Ankle specialists, our job is addressing the lower extremity problem and possible  causes. Regarding extra body weight, we encourage patients to discuss diet and weight management plans with their primary care doctors. It is this team approach that gives you the best opportunity for pain relief and getting you back on your feet.

## 2018-12-04 NOTE — LETTER
12/4/2018         RE: Amelia Canavan  [address]  [place] [address]        Dear Colleague,    Thank you for referring your patient, Amelia Canavan, to the [facility]. Please see a copy of my visit note below.    PATIENT HISTORY:  Dr. Ag requested I see this patient for their foot issue.  Amelia Canavan is a 43 year old female who presents to clinic for pain to the back of the left heel. Notes she has had it for 2 years. Gets a burning pain. Can be 10/10. Better with heels on. Worse with standing or increased activity. Has tried stretching but not helpful. Wondering what can be done.     Review of Systems:  Patient denies fever, chills, rash, wound, stiffness, numbness, weakness, heart burn, blood in stool, chest pain with activity, calf pain when walking, shortness of breath with activity, chronic cough, easy bleeding/bruising, swelling of ankles, excessive thirst, fatigue, depression, anxiety.  Patient admits to limping.     PAST MEDICAL HISTORY:   Past Medical History:   Diagnosis Date     Cervical high risk HPV (human papillomavirus) test positive 10/27/16, 11/27/17    (not 16 or 18)     DM type 2, goal A1c below 7 9/6/2013     Hyperlipidemia LDL goal < 100 9/6/2013     Obese 8/30/2013     PCOS (polycystic ovarian syndrome) 8/30/2013        PAST SURGICAL HISTORY:   Past Surgical History:   Procedure Laterality Date     COLPOSCOPY, BIOPSY, COMBINED  2003     LEEP TX, CERVICAL  2003        MEDICATIONS:   Current Outpatient Prescriptions:      blood glucose monitoring (ACCU-CHEK FASTCLIX) lancets, 1 each 2 times daily (Patient not taking: Reported on 10/4/2018), Disp: 3 Box, Rfl: 4     blood glucose monitoring (NO BRAND SPECIFIED) test strip, 1 strip by In Vitro route every other day (Patient not taking: Reported on 10/4/2018), Disp: 3 Box, Rfl: 1     buPROPion (WELLBUTRIN XL) 150 MG 24 hr tablet, Take 1 tablet (150 mg) by mouth daily, Disp: 90 tablet, Rfl: 1     Flaxseed,  Linseed, (FLAXSEED OIL) 1000 MG CAPS, Take 1 capsule by mouth daily, Disp: , Rfl:      ibuprofen (ADVIL,MOTRIN) 600 MG tablet, Take 1 tablet (600 mg) by mouth every 6 hours as needed for pain, Disp: 30 tablet, Rfl: 1     lamoTRIgine (LAMICTAL) 150 MG tablet, Take 150 mg by mouth daily, Disp: , Rfl: 1     metroNIDAZOLE (FLAGYL) 500 MG tablet, Take 1 tablet (500 mg) by mouth 2 times daily (Patient not taking: Reported on 12/3/2018), Disp: 14 tablet, Rfl: 0     triamcinolone (KENALOG) 0.1 % cream, Apply sparingly to affected area three times daily as needed, Disp: 80 g, Rfl: 0     valACYclovir (VALTREX) 1000 mg tablet, Take 2 tablets (2,000 mg) by mouth 2 times daily, Disp: 30 tablet, Rfl: 0     ALLERGIES:    Allergies   Allergen Reactions     Sulfa Drugs Anaphylaxis        SOCIAL HISTORY:   Social History     Social History     Marital status: Single     Spouse name: N/A     Number of children: N/A     Years of education: N/A     Occupational History     Not on file.     Social History Main Topics     Smoking status: Never Smoker     Smokeless tobacco: Never Used      Comment: Maybe once every 2 months     Alcohol use 2.4 oz/week     4 Standard drinks or equivalent per week      Comment: 5 drinks per week     Drug use: No     Sexual activity: Yes     Partners: Male     Other Topics Concern     Not on file     Social History Narrative        FAMILY HISTORY:   Family History   Problem Relation Age of Onset     Hypertension Mother      Heart Disease Maternal Grandfather      Family History Negative Father         EXAM:Vitals: /68    General appearance: Patient is alert and fully cooperative with history & exam.  No sign of distress is noted during the visit.     Psychiatric: Affect is pleasant & appropriate.  Patient appears motivated to improve health.     Respiratory: Breathing is regular & unlabored while sitting.     HEENT: Hearing is intact to spoken word.  Speech is clear.  No gross evidence of visual  impairment that would impact ambulation.     Dermatologic: Skin is intact to both lower extremities without significant lesions, rash or abrasion.  No paronychia or evidence of soft tissue infection is noted.     Vascular: DP & PT pulses are intact & regular bilaterally.  No significant edema or varicosities noted.  CFT and skin temperature is normal to both lower extremities.     Neurologic: Lower extremity sensation is intact to light touch.  No evidence of weakness or contracture in the lower extremities.  No evidence of neuropathy.     Musculoskeletal: Patient is ambulatory without assistive device or brace.  Prominent posterior heel spur. Pain on achilles tendon insertion and decrease dorsiflexion of left ankle.     Radiographs:  I personally reviewed the left foot xrays. Posterior heel spur and haglunds deformity. No fractures.     ASSESSMENT:    Left foot pain  Achilles tendinitis of left lower extremity  Equinus deformity of left foot  Heel spur, left  Pes cavus     PLAN:  Review[facility] in [facility]. Reviewed and discussed causes of tendonitis.  We discussed treatments such as immobiliation, icing, stretching, heel lifts, orthotics, physical therapy, MRI.     Talked about equinus.  Talked about the spur. Discussed surgical removal. This would require dettachment and reattachment of the tendon and lengthening of the tendon. Talked about risks including infection, numbness, continued pain, recurrence, need for further surgery, blood loss, blood clotting. You will scar.    She would be non weight bearing for 4-6 weeks then minimal weight bearing for 4-6. She is going to think about surgery.        Juliana Del Rio DPM, Podiatry/Foot and Ankle Surgery    Weight management plan: Patient was referred to their PCP to discuss a diet and exercise plan.    Patient to follow up with Primary Care provider regarding elevated blood pressure.        Again, thank you for allowing me to participate in the care of your  patient.        Sincerely,        Juliana Del Rio DPM, Podiatry/Foot and Ankle Surgery

## 2018-12-04 NOTE — PROGRESS NOTES
PATIENT HISTORY:  Dr. Ag requested I see this patient for their foot issue.  Amelia Canavan is a 43 year old female who presents to clinic for pain to the back of the left heel. Notes she has had it for 2 years. Gets a burning pain. Can be 10/10. Better with heels on. Worse with standing or increased activity. Has tried stretching but not helpful. Wondering what can be done.     Review of Systems:  Patient denies fever, chills, rash, wound, stiffness, numbness, weakness, heart burn, blood in stool, chest pain with activity, calf pain when walking, shortness of breath with activity, chronic cough, easy bleeding/bruising, swelling of ankles, excessive thirst, fatigue, depression, anxiety.  Patient admits to limping.     PAST MEDICAL HISTORY:   Past Medical History:   Diagnosis Date     Cervical high risk HPV (human papillomavirus) test positive 10/27/16, 11/27/17    (not 16 or 18)     DM type 2, goal A1c below 7 9/6/2013     Hyperlipidemia LDL goal < 100 9/6/2013     Obese 8/30/2013     PCOS (polycystic ovarian syndrome) 8/30/2013        PAST SURGICAL HISTORY:   Past Surgical History:   Procedure Laterality Date     COLPOSCOPY, BIOPSY, COMBINED  2003     LEEP TX, CERVICAL  2003        MEDICATIONS:   Current Outpatient Prescriptions:      blood glucose monitoring (ACCU-CHEK FASTCLIX) lancets, 1 each 2 times daily (Patient not taking: Reported on 10/4/2018), Disp: 3 Box, Rfl: 4     blood glucose monitoring (NO BRAND SPECIFIED) test strip, 1 strip by In Vitro route every other day (Patient not taking: Reported on 10/4/2018), Disp: 3 Box, Rfl: 1     buPROPion (WELLBUTRIN XL) 150 MG 24 hr tablet, Take 1 tablet (150 mg) by mouth daily, Disp: 90 tablet, Rfl: 1     Flaxseed, Linseed, (FLAXSEED OIL) 1000 MG CAPS, Take 1 capsule by mouth daily, Disp: , Rfl:      ibuprofen (ADVIL,MOTRIN) 600 MG tablet, Take 1 tablet (600 mg) by mouth every 6 hours as needed for pain, Disp: 30 tablet, Rfl: 1     lamoTRIgine (LAMICTAL) 150 MG  tablet, Take 150 mg by mouth daily, Disp: , Rfl: 1     metroNIDAZOLE (FLAGYL) 500 MG tablet, Take 1 tablet (500 mg) by mouth 2 times daily (Patient not taking: Reported on 12/3/2018), Disp: 14 tablet, Rfl: 0     triamcinolone (KENALOG) 0.1 % cream, Apply sparingly to affected area three times daily as needed, Disp: 80 g, Rfl: 0     valACYclovir (VALTREX) 1000 mg tablet, Take 2 tablets (2,000 mg) by mouth 2 times daily, Disp: 30 tablet, Rfl: 0     ALLERGIES:    Allergies   Allergen Reactions     Sulfa Drugs Anaphylaxis        SOCIAL HISTORY:   Social History     Social History     Marital status: Single     Spouse name: N/A     Number of children: N/A     Years of education: N/A     Occupational History     Not on file.     Social History Main Topics     Smoking status: Never Smoker     Smokeless tobacco: Never Used      Comment: Maybe once every 2 months     Alcohol use 2.4 oz/week     4 Standard drinks or equivalent per week      Comment: 5 drinks per week     Drug use: No     Sexual activity: Yes     Partners: Male     Other Topics Concern     Not on file     Social History Narrative        FAMILY HISTORY:   Family History   Problem Relation Age of Onset     Hypertension Mother      Heart Disease Maternal Grandfather      Family History Negative Father         EXAM:Vitals: /68    General appearance: Patient is alert and fully cooperative with history & exam.  No sign of distress is noted during the visit.     Psychiatric: Affect is pleasant & appropriate.  Patient appears motivated to improve health.     Respiratory: Breathing is regular & unlabored while sitting.     HEENT: Hearing is intact to spoken word.  Speech is clear.  No gross evidence of visual impairment that would impact ambulation.     Dermatologic: Skin is intact to both lower extremities without significant lesions, rash or abrasion.  No paronychia or evidence of soft tissue infection is noted.     Vascular: DP & PT pulses are intact & regular  bilaterally.  No significant edema or varicosities noted.  CFT and skin temperature is normal to both lower extremities.     Neurologic: Lower extremity sensation is intact to light touch.  No evidence of weakness or contracture in the lower extremities.  No evidence of neuropathy.     Musculoskeletal: Patient is ambulatory without assistive device or brace.  Prominent posterior heel spur. Pain on achilles tendon insertion and decrease dorsiflexion of left ankle.     Radiographs:  I personally reviewed the left foot xrays. Posterior heel spur and haglunds deformity. No fractures.     ASSESSMENT:    Left foot pain  Achilles tendinitis of left lower extremity  Equinus deformity of left foot  Heel spur, left  Pes cavus     PLAN:  Reviewed patient's chart in [facility]. Reviewed and discussed causes of tendonitis.  We discussed treatments such as immobiliation, icing, stretching, heel lifts, orthotics, physical therapy, MRI.     Talked about equinus.  Talked about the spur. Discussed surgical removal. This would require dettachment and reattachment of the tendon and lengthening of the tendon. Talked about risks including infection, numbness, continued pain, recurrence, need for further surgery, blood loss, blood clotting. You will scar.    She would be non weight bearing for 4-6 weeks then minimal weight bearing for 4-6. She is going to think about surgery.        Juliana Del Rio DPM, Podiatry/Foot and Ankle Surgery    Weight management plan: Patient was referred to their PCP to discuss a diet and exercise plan.    Patient to follow up with Primary Care provider regarding elevated blood pressure.

## 2018-12-06 ENCOUNTER — OFFICE VISIT (OUTPATIENT)
Dept: ORTHOPEDICS | Facility: CLINIC | Age: 43
End: 2018-12-06
Payer: COMMERCIAL

## 2018-12-06 DIAGNOSIS — M25.859 FEMORAL ACETABULAR IMPINGEMENT: Primary | ICD-10-CM

## 2018-12-06 DIAGNOSIS — M25.552 HIP PAIN, LEFT: ICD-10-CM

## 2018-12-06 PROCEDURE — 20611 DRAIN/INJ JOINT/BURSA W/US: CPT | Mod: LT | Performed by: FAMILY MEDICINE

## 2018-12-06 RX ADMIN — METHYLPREDNISOLONE ACETATE 40 MG: 40 INJECTION, SUSPENSION INTRA-ARTICULAR; INTRALESIONAL; INTRAMUSCULAR; SOFT TISSUE at 11:19

## 2018-12-06 NOTE — PROGRESS NOTES
Large Joint Injection/Arthocentesis  Date/Time: 12/6/2018 11:19 AM  Performed by: YEO, ALBERT  Authorized by: YEO, ALBERT     Indications:  Pain  Needle Size:  22 G  Guidance: ultrasound    Approach:  Anterior  Location:  Hip  Site:  L hip joint  Medications:  40 mg methylPREDNISolone 40 MG/ML  Outcome:  Tolerated well, no immediate complications  Procedure discussed: discussed risks, benefits, and alternatives    Timeout: timeout called immediately prior to procedure    Prep: patient was prepped and draped in usual sterile fashion            Patient reports pain is 2/10 pre procedure. Patient reports pain 0/10 post procedure.    Patient is here for left hip intra-articular ultrasound-guided injection for left hip pain due to IBETH.  Patient tolerated procedure well without complications.  Patient will follow up with Dr. Ag as instructed.

## 2018-12-06 NOTE — MR AVS SNAPSHOT
After Visit Summary   12/6/2018    Amelia Canavan    MRN: [id number]           Patient Information     Date Of Birth          1975        Visit Information        Provider Department      12/6/2018 11:20 AM Yeo, Albert, MD [facility]        Today's Diagnoses     Femoral acetabular impingement    -  1    Hip pain, left           Follow-ups after your visit        Who to contact     If you have questions or need follow up information about today's clinic visit or your schedule please contact [facility] directly at [phone].  Normal or non-critical lab and imaging results will be communicated to you by O[employer], letter or phone within 4 business days after the clinic has received the results. If you do not hear from us within 7 days, please contact the clinic through Oinkt or phone. If you have a critical or abnormal lab result, we will notify you by phone as soon as possible.  Submit refill requests through [employer] or call your pharmacy and they will forward the refill request to us. Please allow 3 business days for your refill to be completed.          Additional Information About Your Visit        MyChart Information     [employer] gives you secure access to your electronic health record. If you see a primary care provider, you can also send messages to your care team and make appointments. If you have questions, please call your primary care clinic.  If you do not have a primary care provider, please call [phone] and they will assist you.        Care EveryWhere ID     This is your Care EveryWhere ID. This could be used by other organizations to access your [facility] medical records  [id number]         Blood Pressure from Last 3 Encounters:   12/04/18 118/68   12/03/18 122/80   10/04/18 133/85    Weight from Last 3 Encounters:   12/03/18 210 lb (95.3 kg)   10/04/18 218 lb 4.8 oz (99 kg)   07/23/18 219 lb 14.4 oz (99.7 kg)              We Performed  the Following     Large Joint Injection/Arthocentesis        Primary Care Provider Office Phone # Fax #    Connie Lopez [phone] [address] [facility] [address]        Equal Access to Services     JAMES WEAVER : Jignesh elvin mendenhall libbyo Sobrianali, waaxda luqadaha, qaybta kaalmada adedamaris, tk messina awaalexia gamez lagwendolynconnor sanchez. So [facility] [phone].    ATENCIÓN: Si habla español, tiene a oates disposición servicios gratuitos de asistencia lingüística. Llame al [phone].    We comply with applicable federal civil rights laws and [place] laws. We do not discriminate on the basis of race, color, national origin, age, disability, sex, sexual orientation, or gender identity.            Thank you!     Thank you for choosing [facility]  for your care. Our goal is always to provide you with excellent care. Hearing back from our patients is one way we can continue to improve our services. Please take a few minutes to complete the written survey that you may receive in the mail after your visit with us. Thank you!             Your Updated Medication List - Protect others around you: Learn how to safely use, store and throw away your medicines at www.disposemymeds.org.          This list is accurate as of 12/6/18 11:59 PM.  Always use your most recent med list.                   Brand Name Dispense Instructions for use Diagnosis    blood glucose monitoring lancets     3 Box    1 each 2 times daily    Diabetes mellitus type II, controlled, with no complications (H)       blood glucose monitoring test strip    NO BRAND SPECIFIED    3 Box    1 strip by In Vitro route every other day    Diabetes mellitus type II, controlled, with no complications (H)       buPROPion 150 MG 24 hr tablet    WELLBUTRIN XL    90 tablet    Take 1 tablet (150 mg) by mouth daily    Major depressive disorder, recurrent episode, moderate (H)       flaxseed oil 1000 MG Caps      Take 1  capsule by mouth daily        ibuprofen 600 MG tablet    ADVIL/MOTRIN    30 tablet    Take 1 tablet (600 mg) by mouth every 6 hours as needed for pain    Pelvic pain in female       lamoTRIgine 150 MG tablet    LaMICtal     Take 150 mg by mouth daily        metroNIDAZOLE 500 MG tablet    FLAGYL    14 tablet    Take 1 tablet (500 mg) by mouth 2 times daily    Bacterial vaginosis       triamcinolone 0.1 % external cream    KENALOG    80 g    Apply sparingly to affected area three times daily as needed    Eczema       valACYclovir 1000 mg tablet    VALTREX    30 tablet    Take 2 tablets (2,000 mg) by mouth 2 times daily    Recurrent cold sores

## 2018-12-06 NOTE — LETTER
12/6/2018         RE: Amelia Canavan  [address]  [place] [address]        Dear Colleague,    Thank you for referring your patient, Amelia Canavan, to the [facility]. Please see a copy of my visit note below.      Large Joint Injection/Arthocentesis  Date/Time: 12/6/2018 11:19 AM  Performed by: YEO, ALBERT  Authorized by: YEO, ALBERT     Indications:  Pain  Needle Size:  22 G  Guidance: ultrasound    Approach:  Anterior  Location:  Hip  Site:  L hip joint  Medications:  40 mg methylPREDNISolone 40 MG/ML  Outcome:  Tolerated well, no immediate complications  Procedure discussed: discussed risks, benefits, and alternatives    Timeout: timeout called immediately prior to procedure    Prep: patient was prepped and draped in usual sterile fashion            Patient reports pain is 2/10 pre procedure. Patient reports pain 0/10 post procedure.    Patient is here for left hip intra-articular ultrasound-guided injection for left hip pain due to IBETH.  Patient tolerated procedure well without complications.  Patient will follow up with Dr. Ag as instructed.    Again, thank you for allowing me to participate in the care of your patient.        Sincerely,        Albert Yeo, MD

## 2018-12-07 RX ORDER — METHYLPREDNISOLONE ACETATE 40 MG/ML
40 INJECTION, SUSPENSION INTRA-ARTICULAR; INTRALESIONAL; INTRAMUSCULAR; SOFT TISSUE
Status: SHIPPED | OUTPATIENT
Start: 2018-12-06

## 2018-12-12 ENCOUNTER — TELEPHONE (OUTPATIENT)
Dept: FAMILY MEDICINE | Facility: CLINIC | Age: 43
End: 2018-12-12

## 2018-12-12 DIAGNOSIS — Z87.440 HISTORY OF UTI: ICD-10-CM

## 2018-12-12 DIAGNOSIS — Z87.42 HISTORY OF VAGINAL INFECTION: ICD-10-CM

## 2018-12-12 DIAGNOSIS — B00.1 RECURRENT COLD SORES: ICD-10-CM

## 2018-12-12 RX ORDER — CEPHALEXIN 500 MG/1
500 CAPSULE ORAL 2 TIMES DAILY
Qty: 6 CAPSULE | Refills: 0 | Status: CANCELLED | OUTPATIENT
Start: 2018-12-12

## 2018-12-12 RX ORDER — FLUCONAZOLE 150 MG/1
150 TABLET ORAL ONCE
Qty: 1 TABLET | Refills: 0 | Status: CANCELLED | OUTPATIENT
Start: 2018-12-12 | End: 2018-12-12

## 2018-12-12 RX ORDER — VALACYCLOVIR HYDROCHLORIDE 1 G/1
2000 TABLET, FILM COATED ORAL 2 TIMES DAILY
Qty: 30 TABLET | Refills: 0 | Status: CANCELLED | OUTPATIENT
Start: 2018-12-12

## 2018-12-12 NOTE — TELEPHONE ENCOUNTER
Reason for Call:  Medication or medication refill:cipro/ valtrex/dif;ucan going on a trip to [place] and wants as a precaution    Do you use a [facility]?  Name of the pharmacy and phone number for the current request:       [id number] [place] [facility] [place], [place] - [address] [place] [address]        Name of the medication requested:     Other request:     Can we leave a detailed message on this number? YES    Phone number patient can be reached at: Home number on file [phone] (home)    Best Time: any    Call taken on 12/12/2018 at 1:56 PM by Hien Fuentes

## 2018-12-12 NOTE — TELEPHONE ENCOUNTER
A.S  Spoke with patient.  States she doesn't have time for travel visit.  Leaving Tuesday 12/18    States the Valtrex and Cipro are what she really needs    States she is asking for Valtrex because she tends to develop cold sores when in the strong sun.  States the Cipro is in case she gets a UTI tend to get them after swimming having wet suit. (She was given Keflex)    States you gave her this at July OV.    Note from 7/23/18 OV:  [place] work  Reports history of urinary tract infection- would like to keep antibiotic handy & with antibiotic , also gets vaginal yeast infection    You gave patient Valtrex, Diflucan and Keflex  Do you want to do a telephone visit?    Order T'd up from 7/23/18 OV.  Please advise.  Thanks, Rika Bartlett, RN

## 2018-12-13 ENCOUNTER — VIRTUAL VISIT (OUTPATIENT)
Dept: FAMILY MEDICINE | Facility: CLINIC | Age: 43
End: 2018-12-13
Payer: COMMERCIAL

## 2018-12-13 DIAGNOSIS — Z87.42 HISTORY OF VAGINAL INFECTION: ICD-10-CM

## 2018-12-13 DIAGNOSIS — Z87.440 HISTORY OF UTI: Primary | ICD-10-CM

## 2018-12-13 DIAGNOSIS — B00.1 RECURRENT COLD SORES: ICD-10-CM

## 2018-12-13 PROCEDURE — 99441 ZZC PHYSICIAN TELEPHONE EVALUATION 5-10 MIN: CPT | Performed by: FAMILY MEDICINE

## 2018-12-13 RX ORDER — FLUCONAZOLE 150 MG/1
150 TABLET ORAL ONCE
Qty: 1 TABLET | Refills: 0 | Status: SHIPPED | OUTPATIENT
Start: 2018-12-13 | End: 2018-12-13

## 2018-12-13 RX ORDER — CEPHALEXIN 500 MG/1
500 CAPSULE ORAL 2 TIMES DAILY
Qty: 6 CAPSULE | Refills: 0 | Status: SHIPPED | OUTPATIENT
Start: 2018-12-13 | End: 2018-12-16

## 2018-12-13 RX ORDER — VALACYCLOVIR HYDROCHLORIDE 1 G/1
2000 TABLET, FILM COATED ORAL 2 TIMES DAILY PRN
Qty: 30 TABLET | Refills: 0 | Status: SHIPPED | OUTPATIENT
Start: 2018-12-13

## 2018-12-13 NOTE — PROGRESS NOTES
Amelia Canavan is a 43 year old female who is being evaluated via a telephone visit.      S: The history as provided by the patient to the provider during this 3 way call include   Pertinent parts of the the patient's medical history reviewed and confirmed by the provider included :     Patient is traveling to [facility] for month- for graduate work  History of Cold sore on and off - needs refill on valtrex- single day course is effective   History of - urinary tract infection, would like to get prescription for antibiotic- to be used just in case and often gets vaginal yeast infection after antibiotic- so wants diflucan as well       We offered travel clinic and patient reports she does not have the time to deal with it    Assessment/Plan:  1. Recurrent cold sores  Plan:   - valACYclovir (VALTREX) 1000 mg tablet; Take 2 tablets (2,000 mg) by mouth 2 times daily single day course  (single day course 2 tabs -12 hours apart)  Dispense: 30 tablet; Refill: 0    2. History of UTI  Plan: encouraged hydration ideally a urine test prior to antibiotic.    - cephALEXin (KEFLEX) 500 MG capsule; Take 1 capsule (500 mg) by mouth 2 times daily for 3 days  Dispense: 6 capsule; Refill: 0      3. History of vaginal infection  Plan:  - fluconazole (DIFLUCAN) 150 MG tablet; Take 1 tablet (150 mg) by mouth once for 1 dose  Dispense: 1 tablet; Refill: 0    encouarged to see provider for confirmation of urinary tract infection or vaginal infection- idelaly prior to treatment    Potential medication side effects were discussed with the patient; let me know if any occur.      Total time of call between patient and provider was 5 minutes     Connie Lopez                 ===================================================    I have reviewed the note as documented above.  This accurately captures the substance of my conversation with the patient,                Requesting Valacyclovir refill and Cipro and Diflucan for travel    The  "patient has been notified of following (by M.A)      \"We have found that certain health care needs can be provided without the need for a physical exam.  This service lets us provide the care you need with a short phone conversation.  If a prescription is necessary we can send it directly to your pharmacy.  If lab work is needed we can place an order for that and you can then stop by our lab to have the test done at a later time.    This telephone visit will be conducted  with you (the patient) and the physician/provider. We will have full access to your [facility] medical record during this entire phone call.    Since this is like an office visit,  will bill your insurance company for this service.  Please check with your medical insurance if this type of telephone/virtual is covered . You may be responsible for the cost of this service if insurance coverage is denied.  The typical cost is $30 (10min), $59(11-20min) and $85 (21-30min)     If during the course of the call the physician/provider feels a telephone visit is not appropriate, you will not be charged for this service\"    Consent has been obtained for this service by care team member: yes.  See the scanned image in the medical record.    "

## 2018-12-13 NOTE — TELEPHONE ENCOUNTER
Patient informed.    Next 5 appointments (look out 90 days)    Dec 13, 2018 11:20 AM CST  Telephone Visit with Connie Lopez MD  [facility] [address] [facility] [address]  [phone]        Sugey FISH RN

## 2019-07-09 ENCOUNTER — TELEPHONE (OUTPATIENT)
Dept: FAMILY MEDICINE | Facility: CLINIC | Age: 44
End: 2019-07-09

## 2019-07-09 NOTE — TELEPHONE ENCOUNTER
Reason for Call:  Other    Detailed comments: Pt called stating she got a referral from her Ob/GYN doctor for physical therapy but her insurance won't cover it because referral is not from PCP. Pt is requesting a back dated referral. Please call to discuss.     Phone Number Patient can be reached at: Home number on file [phone] (home)    Best Time: Anytime     Can we leave a detailed message on this number? YES    Call taken on 7/9/2019 at 9:46 AM by Carla Smith

## 2019-07-09 NOTE — TELEPHONE ENCOUNTER
Please call patient to clarify    what's the diagnoses from  OBG/GYN indicating PHYSICAL THERAPY . If she is not clear  [place] indicatio and accordingly advise the PHYSICAL THERAPY.

## 2019-07-09 NOTE — TELEPHONE ENCOUNTER
AS, fyi, last visit a virtual visit 12/2018, last OV 10/04/2018, pt overdue for phx, labs etc.    Requesting referral for PT as was recommended by OB/Gyn, but referral needs to be placed by PCP    Kinjal Chen CMA

## 2019-07-10 ENCOUNTER — RECORDS - HEALTHEAST (OUTPATIENT)
Dept: ADMINISTRATIVE | Facility: OTHER | Age: 44
End: 2019-07-10

## 2019-07-10 ENCOUNTER — TRANSFERRED RECORDS (OUTPATIENT)
Dept: HEALTH INFORMATION MANAGEMENT | Facility: CLINIC | Age: 44
End: 2019-07-10

## 2019-07-10 NOTE — TELEPHONE ENCOUNTER
Spoke wiith patient, advised OV, or if patient could send over records from Ob/gyn, referral is for hip pain.  Pt will request records be sent to [facility],     AS- pt is requesting a call once records received to see if you are ok with placing a referral with out OV    Kinjal Chen CMA

## 2019-07-15 NOTE — TELEPHONE ENCOUNTER
CHAY Layton- patient called and left me a message about her records being faxed over from her OB for physical therapy. Can you touch base with the patient since this is not a referral issue she would just need an order for PT Thanks. Her #[phone].    Valerie Ríos  Referral Coordinator

## 2019-07-16 NOTE — TELEPHONE ENCOUNTER
Pt is calling, needs PT referrral from PCP in order to cover PT services already received.  Referral was received from OB but insurance is denying coverage due to not from PCP.  Pt would like referral for PT for retro.  She would also like to continue PT based on previous concerns regarding hips.  Would Dr. Lopez send referral for PT please? Pt goes to [facility] in [place] on [address]  Pt would like call back from Dr. Lopez or Team as well.  OB office sent records last Wednesday so Dr. Lopez has record of previous referral.

## 2019-07-18 NOTE — TELEPHONE ENCOUNTER
Please should have at least  phone only appointment , unless patient willing to come for OV.    I need to decide and understand the medical indication for PHYSICAL THERAPY- so I can refer for the PHYSICAL THERAPY treatment.

## 2019-07-19 NOTE — TELEPHONE ENCOUNTER
Left detailed message on info below.  Advised her to call back to schedule TV or OV with [facility].  Ella Preciado RN

## 2019-07-26 ENCOUNTER — RECORDS - HEALTHEAST (OUTPATIENT)
Dept: ADMINISTRATIVE | Facility: OTHER | Age: 44
End: 2019-07-26

## 2019-07-29 ENCOUNTER — COMMUNICATION - HEALTHEAST (OUTPATIENT)
Dept: TELEHEALTH | Facility: CLINIC | Age: 44
End: 2019-07-29

## 2019-07-29 ENCOUNTER — AMBULATORY - HEALTHEAST (OUTPATIENT)
Dept: LAB | Facility: CLINIC | Age: 44
End: 2019-07-29

## 2019-07-29 DIAGNOSIS — O99.810 ABNORMAL MATERNAL GLUCOSE TOLERANCE, ANTEPARTUM: ICD-10-CM

## 2019-07-29 LAB
FASTING STATUS PATIENT QL REPORTED: YES
GLUCOSE 1H P 100 G GLC PO SERPL-MCNC: 246 MG/DL (ref 70–179)
GLUCOSE 2H P 100 G GLC PO SERPL-MCNC: 229 MG/DL (ref 70–154)
GLUCOSE 3H P 100 G GLC PO SERPL-MCNC: 180 MG/DL (ref 70–139)
GLUCOSE P FAST SERPL-MCNC: 110 MG/DL (ref 70–94)

## 2019-08-01 ENCOUNTER — RECORDS - HEALTHEAST (OUTPATIENT)
Dept: ADMINISTRATIVE | Facility: OTHER | Age: 44
End: 2019-08-01

## 2019-08-02 ENCOUNTER — COMMUNICATION - HEALTHEAST (OUTPATIENT)
Dept: ADMINISTRATIVE | Facility: CLINIC | Age: 44
End: 2019-08-02

## 2019-08-08 ENCOUNTER — RECORDS - HEALTHEAST (OUTPATIENT)
Dept: ADMINISTRATIVE | Facility: OTHER | Age: 44
End: 2019-08-08

## 2019-08-08 ENCOUNTER — AMBULATORY - HEALTHEAST (OUTPATIENT)
Dept: EDUCATION SERVICES | Facility: CLINIC | Age: 44
End: 2019-08-08

## 2019-08-08 DIAGNOSIS — O24.410 DIET CONTROLLED GESTATIONAL DIABETES MELLITUS (GDM), ANTEPARTUM: ICD-10-CM

## 2019-08-15 ENCOUNTER — AMBULATORY - HEALTHEAST (OUTPATIENT)
Dept: EDUCATION SERVICES | Facility: CLINIC | Age: 44
End: 2019-08-15

## 2019-08-15 DIAGNOSIS — O24.419 DM (DIABETES MELLITUS), GESTATIONAL, ANTEPARTUM: ICD-10-CM

## 2019-08-16 ENCOUNTER — AMBULATORY - HEALTHEAST (OUTPATIENT)
Dept: ENDOCRINOLOGY | Facility: CLINIC | Age: 44
End: 2019-08-16

## 2019-08-16 ENCOUNTER — COMMUNICATION - HEALTHEAST (OUTPATIENT)
Dept: EDUCATION SERVICES | Facility: CLINIC | Age: 44
End: 2019-08-16

## 2019-08-16 DIAGNOSIS — O24.119 TYPE 2 DIABETES MELLITUS DURING PREGNANCY, ANTEPARTUM: ICD-10-CM

## 2019-08-20 ENCOUNTER — COMMUNICATION - HEALTHEAST (OUTPATIENT)
Dept: ENDOCRINOLOGY | Facility: CLINIC | Age: 44
End: 2019-08-20

## 2019-08-20 DIAGNOSIS — O24.119 TYPE 2 DIABETES MELLITUS DURING PREGNANCY, ANTEPARTUM: ICD-10-CM

## 2019-10-18 ENCOUNTER — TELEPHONE (OUTPATIENT)
Dept: FAMILY MEDICINE | Facility: CLINIC | Age: 44
End: 2019-10-18

## 2019-10-18 DIAGNOSIS — B97.7 HPV IN FEMALE: ICD-10-CM

## 2019-10-18 DIAGNOSIS — Z98.890 S/P LEEP OF CERVIX: ICD-10-CM

## 2019-10-18 NOTE — TELEPHONE ENCOUNTER
Pt is past due for f/u pap smear.  [facility] clinic and schedule.    Brenna Benton  Pap Tracking

## 2019-11-07 ENCOUNTER — HEALTH MAINTENANCE LETTER (OUTPATIENT)
Age: 44
End: 2019-11-07

## 2019-12-13 ENCOUNTER — TELEPHONE (OUTPATIENT)
Dept: FAMILY MEDICINE | Facility: CLINIC | Age: 44
End: 2019-12-13

## 2019-12-13 NOTE — TELEPHONE ENCOUNTER
Hi inquiring if someone from the clinic could fax a cover sheet to [facility] to request recent Pap records, was told our request must be faxed to them at [phone].   Thank you,  Jeanna Stone RN-Pap Tracking

## 2020-02-17 ENCOUNTER — HEALTH MAINTENANCE LETTER (OUTPATIENT)
Age: 45
End: 2020-02-17

## 2021-05-31 NOTE — TELEPHONE ENCOUNTER
Received note from [facility] pharmacy that humulin n not cover by insurance. Insurance only cover novolin.    Okay to switch to novolin at the dose/.

## 2021-05-31 NOTE — TELEPHONE ENCOUNTER
External - [facility]   Referring Provider: Dr. Chilel   DX: Gestational Diabetes    Ref./rec. Were received in DM consult folder on 08.01.2019 @ 10:14&10:20

## 2021-05-31 NOTE — TELEPHONE ENCOUNTER
Per patient was recently hospitalized and put on bedrest. She's not sure if what her restrictions are at this time. She has an appointment with Dr. Chilel tomorrow and will discuss this with him. She will call back to schedule if she needs to.     Please inform referring office of the patients decision.

## 2021-06-03 VITALS — BODY MASS INDEX: 36.95 KG/M2 | WEIGHT: 257.5 LBS

## 2021-06-03 VITALS — WEIGHT: 254 LBS | BODY MASS INDEX: 36.45 KG/M2

## 2021-07-03 NOTE — ADDENDUM NOTE
Addendum Note by Glendy Oglesby Diabetes Ed at 8/15/2019 11:30 AM     Author: Glendy Olgesby Diabetes Ed Service: -- Author Type: Diabetes Ed    Filed: 8/16/2019  2:20 PM Encounter Date: 8/15/2019 Status: Signed    : Glendy Oglesby Diabetes Ed (Diabetes Ed)    Addended by: GLENDY OGLESBY on: 8/16/2019 02:20 PM        Modules accepted: Orders

## 2021-07-03 NOTE — ADDENDUM NOTE
Addendum Note by Carlos Casey NP at 8/16/2019  1:45 PM     Author: Carlos Casey NP Service: -- Author Type: Nurse Practitioner    Filed: 8/16/2019  2:30 PM Encounter Date: 8/16/2019 Status: Signed    : Carlos Casey NP (Nurse Practitioner)    Addended by: CARLOS CASEY on: 8/16/2019 02:30 PM        Modules accepted: Orders